# Patient Record
Sex: FEMALE | Race: WHITE | NOT HISPANIC OR LATINO | Employment: UNEMPLOYED | URBAN - METROPOLITAN AREA
[De-identification: names, ages, dates, MRNs, and addresses within clinical notes are randomized per-mention and may not be internally consistent; named-entity substitution may affect disease eponyms.]

---

## 2023-04-10 LAB
COLLECTION DURATION OF URINE: NORMAL
CORTISOL UR FREE PER VOLUME: NORMAL
CORTISOL, URINE FREE - PER 24H: NORMAL
CORTISOL, URINE INTERPRETATION: NORMAL
CORTISOL/CREATININE RATIO: NORMAL
CREATININE (MG/24HR) IN 24 HOUR URINE: NORMAL
CREATININE (MG/DL) IN URINE: NORMAL
CREATININE, URINE - PER VOLUME: NORMAL
HOURS COLLECTED (ARUP): NORMAL
TOTAL VOLUME (ARUP): NORMAL
VOLUME OF URINE: NORMAL

## 2023-04-11 LAB
CORTISOL (UG/DL) IN SERUM: 8.9 UG/DL (ref 2.5–20)
THYROTROPIN (MIU/L) IN SER/PLAS BY DETECTION LIMIT <= 0.05 MIU/L: 2.5 MIU/L (ref 0.44–3.98)
THYROXINE (T4) FREE (NG/DL) IN SER/PLAS: 1.18 NG/DL (ref 0.78–1.48)

## 2023-10-07 PROBLEM — G90.1 DYSAUTONOMIA (MULTI): Status: ACTIVE | Noted: 2023-10-07

## 2023-10-07 PROBLEM — K21.00 GASTROESOPHAGEAL REFLUX DISEASE WITH ESOPHAGITIS: Status: ACTIVE | Noted: 2023-10-07

## 2023-10-07 PROBLEM — F31.9 BIPOLAR AFFECTIVE (MULTI): Status: ACTIVE | Noted: 2023-10-07

## 2023-10-07 PROBLEM — M25.50 ARTHRALGIA: Status: ACTIVE | Noted: 2023-10-07

## 2023-10-07 PROBLEM — R06.00 DYSPNEA: Status: ACTIVE | Noted: 2023-10-07

## 2023-10-07 PROBLEM — M77.9 TENDONITIS: Status: ACTIVE | Noted: 2023-10-07

## 2023-10-07 PROBLEM — F41.0 PANIC DISORDER: Status: ACTIVE | Noted: 2023-10-07

## 2023-10-07 PROBLEM — E73.9 LACTOSE INTOLERANCE IN ADULT: Status: ACTIVE | Noted: 2023-10-07

## 2023-10-07 PROBLEM — R10.9 ABDOMINAL PAIN IN FEMALE: Status: ACTIVE | Noted: 2023-10-07

## 2023-10-07 PROBLEM — M70.60 GREATER TROCHANTERIC BURSITIS: Status: ACTIVE | Noted: 2023-10-07

## 2023-10-07 PROBLEM — N92.6 ABNORMAL MENSTRUAL PERIODS: Status: ACTIVE | Noted: 2023-10-07

## 2023-10-07 PROBLEM — J40 BRONCHITIS: Status: ACTIVE | Noted: 2023-10-07

## 2023-10-07 PROBLEM — F90.0 ADHD (ATTENTION DEFICIT HYPERACTIVITY DISORDER), INATTENTIVE TYPE: Status: ACTIVE | Noted: 2023-10-07

## 2023-10-07 PROBLEM — Q79.62: Status: ACTIVE | Noted: 2023-10-07

## 2023-10-07 PROBLEM — D89.40 MAST CELL ACTIVATION SYNDROME (MULTI): Status: ACTIVE | Noted: 2023-10-07

## 2023-10-07 PROBLEM — K75.81 NASH (NONALCOHOLIC STEATOHEPATITIS): Status: ACTIVE | Noted: 2023-10-07

## 2023-10-07 PROBLEM — K21.9 GERD WITHOUT ESOPHAGITIS: Status: ACTIVE | Noted: 2023-10-07

## 2023-10-07 PROBLEM — N94.6 MENSTRUAL CRAMPS: Status: ACTIVE | Noted: 2023-10-07

## 2023-10-07 PROBLEM — J45.909 ASTHMA (HHS-HCC): Status: ACTIVE | Noted: 2023-10-07

## 2023-10-07 PROBLEM — R51.9 HEADACHE: Status: ACTIVE | Noted: 2023-10-07

## 2023-10-07 PROBLEM — K90.41 WHEAT INTOLERANCE: Status: ACTIVE | Noted: 2023-10-07

## 2023-10-07 PROBLEM — F32.1 MODERATE MAJOR DEPRESSION (MULTI): Status: ACTIVE | Noted: 2023-10-07

## 2023-10-07 PROBLEM — I47.11 INAPPROPRIATE SINUS TACHYCARDIA (CMS-HCC): Status: ACTIVE | Noted: 2023-10-07

## 2023-10-07 PROBLEM — E55.9 VITAMIN D INSUFFICIENCY: Status: ACTIVE | Noted: 2023-10-07

## 2023-10-07 PROBLEM — F31.81 BIPOLAR 2 DISORDER (MULTI): Status: ACTIVE | Noted: 2023-10-07

## 2023-10-07 PROBLEM — M35.7 BENIGN JOINT HYPERMOBILITY: Status: ACTIVE | Noted: 2023-10-07

## 2023-10-07 PROBLEM — F41.9 ANXIETY: Status: ACTIVE | Noted: 2023-10-07

## 2023-10-07 PROBLEM — R53.82 CHRONIC FATIGUE: Status: ACTIVE | Noted: 2023-10-07

## 2023-10-07 PROBLEM — K44.9 HIATAL HERNIA: Status: ACTIVE | Noted: 2023-10-07

## 2023-10-07 PROBLEM — N12 PYELONEPHRITIS: Status: ACTIVE | Noted: 2023-10-07

## 2023-10-07 PROBLEM — G47.33 OBSTRUCTIVE SLEEP APNEA: Status: ACTIVE | Noted: 2023-10-07

## 2023-10-07 PROBLEM — G43.909 MIGRAINE: Status: ACTIVE | Noted: 2023-10-07

## 2023-10-07 PROBLEM — J11.2 INFLUENZA WITH GASTROINTESTINAL TRACT INVOLVEMENT: Status: ACTIVE | Noted: 2023-10-07

## 2023-10-07 RX ORDER — LORAZEPAM 4 MG/ML
INJECTION, SOLUTION INTRAMUSCULAR; INTRAVENOUS
COMMUNITY
Start: 2017-08-29

## 2023-10-07 RX ORDER — ONDANSETRON 4 MG/1
1 TABLET, FILM COATED ORAL 2 TIMES DAILY PRN
COMMUNITY
Start: 2017-03-04

## 2023-10-07 RX ORDER — OMEPRAZOLE 40 MG/1
1 CAPSULE, DELAYED RELEASE ORAL 2 TIMES DAILY
COMMUNITY
Start: 2017-03-29

## 2023-10-07 RX ORDER — HYDROXYZINE HYDROCHLORIDE 50 MG/1
1 TABLET, FILM COATED ORAL
COMMUNITY
Start: 2017-07-26

## 2023-10-07 RX ORDER — ONDANSETRON 4 MG/1
1 TABLET, ORALLY DISINTEGRATING ORAL EVERY 8 HOURS PRN
COMMUNITY
Start: 2018-01-05

## 2023-10-09 ENCOUNTER — OFFICE VISIT (OUTPATIENT)
Dept: PAIN MEDICINE | Facility: CLINIC | Age: 28
End: 2023-10-09
Payer: COMMERCIAL

## 2023-10-09 VITALS — HEIGHT: 63 IN | WEIGHT: 293 LBS | BODY MASS INDEX: 51.91 KG/M2 | RESPIRATION RATE: 17 BRPM

## 2023-10-09 DIAGNOSIS — M47.816 FACET ARTHRITIS OF LUMBAR REGION: Primary | ICD-10-CM

## 2023-10-09 DIAGNOSIS — M53.3 SACROILIAC JOINT DYSFUNCTION OF LEFT SIDE: ICD-10-CM

## 2023-10-09 PROCEDURE — 99203 OFFICE O/P NEW LOW 30 MIN: CPT | Performed by: PAIN MEDICINE

## 2023-10-09 ASSESSMENT — PAIN SCALES - GENERAL
PAINLEVEL_OUTOF10: 4
PAINLEVEL: 4

## 2023-10-09 ASSESSMENT — PAIN DESCRIPTION - DESCRIPTORS: DESCRIPTORS: ACHING;RADIATING

## 2023-10-09 ASSESSMENT — PAIN - FUNCTIONAL ASSESSMENT: PAIN_FUNCTIONAL_ASSESSMENT: 0-10

## 2023-10-09 NOTE — PROGRESS NOTES
History Of Present Illness  Monique Bender is a 27 y.o. female with a past medical history of anxiety, bipolar disorder presents, Tesha Danlos syndrome, Bertolotti sydrome (transitional L5 vertbrae), morbid obesity presents to pain clinic for management of low back pain.   Patient states she has had this pain for years and is worse with any sort of prolonged movement.  She states the pain is primarily on the left side of her lower back. She says bending forward makes her pain excruciating.   She does not endorse any true radicular symptoms at this time or motor weakness in her legs.  She is previously followed with pain management at [facility] that has primarily attributed her pain to weight.  She states she is seeing nutritionist as well as some physical therapy.  She is not interested in bariatric surgery at this time.  She has seen spine surgeon who recommended conservative management.  Last advanced imaging was done in 2022 that showed a transitional L5 vertebra along with mild degenerative changes at L4-5,  otherwise largely unremarkable.   Her medications patient takes gabapentin 300 neuropathic pain in her arms.  is also lorazepam she takes as needed as well as for depression.   She has previously been tried on topiramate however was discontinued due to kidney stones. The pain causes significant stress in the patient's life, specifically interferes with general activity, mood, walking ability, ability to perform tasks at home and/or work.  Patient participates in physical therapy and continues to perform physician directed exercises at home. Denies any bowel or bladder incontinence, saddle anesthesia, worsening pain, weakness or falls.     Past Medical History  She has a past medical history of Cough, unspecified (12/08/2014), Local infection of the skin and subcutaneous tissue, unspecified (02/11/2014), Personal history of other diseases of the digestive system (09/15/2017), Personal history  of other diseases of the respiratory system (12/06/2015), Personal history of other diseases of the respiratory system (09/21/2016), Personal history of other diseases of the respiratory system (12/06/2015), Personal history of other diseases of the respiratory system (12/08/2014), Personal history of other specified conditions (09/21/2015), Rash and other nonspecific skin eruption (11/11/2014), Sprain of unspecified ligament of unspecified ankle, initial encounter (02/19/2016), Unspecified abdominal pain (12/16/2014), Unspecified asthma, uncomplicated (12/20/2013), Unspecified injury of right foot, initial encounter (02/24/2016), Urinary tract infection, site not specified (09/21/2016), and Urinary tract infection, site not specified (06/03/2016).    Surgical History  She has a past surgical history that includes Tonsillectomy (09/11/2013).     Social History  She reports that she has quit smoking. Her smoking use included cigarettes. She does not have any smokeless tobacco history on file. She reports that she does not currently use alcohol. She reports that she does not use drugs.    Family History  Family History   Problem Relation Name Age of Onset    Cancer Paternal Grandfather          Allergies  Cefdinir, Levofloxacin, Morphine, Prednisone, Amoxicillin-pot clavulanate, Penicillins, Prochlorperazine, Sulfamethoxazole-trimethoprim, Ciprofloxacin, Sulfa (sulfonamide antibiotics), Aluminum chloride hexahydrate, and Esomeprazole    Review of Symptoms:   Constitutional: Negative for chills, diaphoresis or fever  HENT: Negative for neck swelling  Eyes:.  Negative for eye pain  Respiratory:.  Negative for cough, shortness of breath or wheezing    Cardiovascular:.  Negative for chest pain or palpitations  Gastrointestinal:.  Negative for abdominal pain, nausea and vomiting  Genitourinary:.  Negative for urgency  Musculoskeletal:  Positive for back pain. Positive for joint pain. Denies falls within the past 3  months.  Skin: Negative for wounds or itching   Neurological: Negative for dizziness, seizures, loss of consciousness and weakness  Endo/Heme/Allergies: Does not bruise/bleed easily  Psychiatric/Behavioral: Negative for depression. The patient does not appear anxious.       PHYSICAL EXAM  Vitals signs reviewed  Constitutional:       General: Not in acute distress     Appearance: Normal appearance. Not ill-appearing.  HENT:     Head: Normocephalic and atraumatic  Eyes:     Conjunctiva/sclera: Conjunctivae normal  Cardiovascular:     Rate and Rhythm: Normal rate and regular rhythm  Pulmonary:     Effort: No respiratory distress  Abdominal:     Palpations: Abdomen is soft  Musculoskeletal: WOODWARD  Skin:     General: Skin is warm and dry  Neurological:     General: No focal deficit present  Psychiatric:         Mood and Affect: Mood normal         Behavior: Behavior normal    Advanced Exam   Inspection: No gross deformities, no surgical scars  Palpation: No tenderness of patient of lumbar midline, lumbar paraspinals,  Tenderness over left SIJ  ROM: Normal range of motion of the lumbar flexion extension  Motor: 5-5 strength upper and lower extremities  Sensory: Negative for sensory abnormalities in upper and lower extremities  Reflexes: 2+ reflexes bilateral upper and lower extremities  Lumbar: Negative straight leg raising bilaterally, negative for facet loading  Sacral:  positive Sarbjit positive Gaenslen's on left side  Hip: Negative for pain with anterior, lateral, posterior palpation of hip joints, negative FADIR, negative for internal/external rotation of the hip, negative logroll     Last Recorded Vitals  Resp 17   Wt (!) 177 kg (390 lb)     Relevant Results  Current Outpatient Medications   Medication Instructions    hydrOXYzine HCL (Atarax) 50 mg tablet 1 tablet, oral, 3-4 TIMES DAILY.    LORazepam 4 mg/mL injection LORazepam 4 MG/ML Injection Solution   Refills: 0        Start : 29-Aug-2017   Active    omeprazole  (PriLOSEC) 40 mg DR capsule 1 capsule, oral, 2 times daily    ondansetron (Zofran) 4 mg tablet 1 tablet, oral, 2 times daily PRN    ondansetron ODT (Zofran-ODT) 4 mg disintegrating tablet 1 tablet, oral, Every 8 hours PRN       No results found for this or any previous visit from the past 1000 days.     No image results found.       1. Facet arthritis of lumbar region  Lumbar medial branch block         Joint Injection/Aspiration    Date/Time: 10/9/2023 2:43 PM    Performed by: Samir Pierre MD  Authorized by: Ramesh Ortiz MD    Consent:     Consent obtained:  Written    Consent given by:  Patient    Risks discussed:  Bleeding, infection, pain, incomplete drainage and nerve damage    Alternatives discussed:  No treatment  Universal protocol:     Procedure explained and questions answered to patient or proxy's satisfaction: yes      Relevant documents present and verified: yes      Test results available: yes      Imaging studies available: yes      Required blood products, implants, devices, and special equipment available: yes      Site/side marked: yes      Immediately prior to procedure, a time out was called: yes      Patient identity confirmed:  Verbally with patient  Location:     Location: Left Sacro-illac Joint.  Anesthesia:     Anesthesia method:  None  Procedure details:     Needle gauge: 25g.    Ultrasound guidance: no      Approach:  Posterior    Aspirate amount:  Negative    Steroid injected: no      Specimen collected: no    Post-procedure details:     Dressing:  Adhesive bandage    Procedure completion:  Tolerated well, no immediate complications     Procedure:  In office L SI Joint injection:  After verbal consent was obtained, the patient was placed in a prone position.    The area overlying the left sacroiliac joint was cleaned and prepped in sterile fashion.  Using landmark technique  the left SI joint was identified. A 25g 3.5 inch spinal needle was inserted and placed near the left SI    Joint. 4cc of .75% Bupiviaine was injected,  after negative aspiration. The patient tolerated the procedure well with no complications.       ASSESSMENT/PLAN  Monique Bender is a 27 y.o. female with a past medical history of anxiety, bipolar disorder presents, Tesha Danlos syndrome, Bertolotti sydrome (transitional L5 vertbrae), morbid obesity presents to pain clinic for management of low back pain.  Patient likely has a multifactorial component of her pain including both facet as well as SI joint component.  In addition, patient likely has a large component of the pain coming from her weight,  and is currently seeing specialists for this issue.  Patient has done physical therapy with minimal relief of her symptoms as well as acupuncture.  Patient had relief of pain with in office SI joint injection however she states this is different than her usual sources of pain in her back.   At this point it would be reasonable to schedule patient for left medial branch block and possible SI joint injection in the future    Our plan is as follows:  - Left lumbar medial branch nerve block  - Continue to participate in physical therapy as well as physician directed home exercises  - Continue participating in weight loss program  - Continue pain medications as prescribed  - Can consider left SI joint injection in the future       Samir Pierre MD

## 2023-11-03 ENCOUNTER — DOCUMENTATION (OUTPATIENT)
Dept: OPERATING ROOM | Facility: CLINIC | Age: 28
End: 2023-11-03
Payer: COMMERCIAL

## 2023-11-30 ENCOUNTER — APPOINTMENT (OUTPATIENT)
Dept: NEUROLOGY | Facility: HOSPITAL | Age: 28
End: 2023-11-30
Payer: COMMERCIAL

## 2023-12-13 ENCOUNTER — ANCILLARY PROCEDURE (OUTPATIENT)
Dept: RADIOLOGY | Facility: CLINIC | Age: 28
End: 2023-12-13
Payer: COMMERCIAL

## 2023-12-13 ENCOUNTER — HOSPITAL ENCOUNTER (OUTPATIENT)
Dept: OPERATING ROOM | Facility: CLINIC | Age: 28
Setting detail: OUTPATIENT SURGERY
Discharge: HOME | End: 2023-12-13
Payer: COMMERCIAL

## 2023-12-13 VITALS
WEIGHT: 293 LBS | HEIGHT: 63 IN | DIASTOLIC BLOOD PRESSURE: 62 MMHG | RESPIRATION RATE: 16 BRPM | HEART RATE: 88 BPM | SYSTOLIC BLOOD PRESSURE: 121 MMHG | OXYGEN SATURATION: 97 % | BODY MASS INDEX: 51.91 KG/M2 | TEMPERATURE: 98.2 F

## 2023-12-13 LAB — PREGNANCY TEST URINE, POC: NEGATIVE

## 2023-12-13 PROCEDURE — 62323 NJX INTERLAMINAR LMBR/SAC: CPT | Performed by: PAIN MEDICINE

## 2023-12-13 PROCEDURE — 2500000004 HC RX 250 GENERAL PHARMACY W/ HCPCS (ALT 636 FOR OP/ED): Mod: SE | Performed by: PAIN MEDICINE

## 2023-12-13 PROCEDURE — 81025 URINE PREGNANCY TEST: CPT | Performed by: ANESTHESIOLOGY

## 2023-12-13 PROCEDURE — 64493 INJ PARAVERT F JNT L/S 1 LEV: CPT | Performed by: PAIN MEDICINE

## 2023-12-13 PROCEDURE — 76000 FLUOROSCOPY <1 HR PHYS/QHP: CPT

## 2023-12-13 PROCEDURE — 7100000009 HC PHASE TWO TIME - INITIAL BASE CHARGE: Performed by: PAIN MEDICINE

## 2023-12-13 PROCEDURE — 64495 INJ PARAVERT F JNT L/S 3 LEV: CPT | Performed by: PAIN MEDICINE

## 2023-12-13 PROCEDURE — 2500000005 HC RX 250 GENERAL PHARMACY W/O HCPCS: Mod: SE | Performed by: PAIN MEDICINE

## 2023-12-13 PROCEDURE — 64494 INJ PARAVERT F JNT L/S 2 LEV: CPT | Performed by: PAIN MEDICINE

## 2023-12-13 PROCEDURE — 99152 MOD SED SAME PHYS/QHP 5/>YRS: CPT | Performed by: PAIN MEDICINE

## 2023-12-13 PROCEDURE — 7100000010 HC PHASE TWO TIME - EACH INCREMENTAL 1 MINUTE: Performed by: PAIN MEDICINE

## 2023-12-13 RX ORDER — ERGOCALCIFEROL 1.25 MG/1
1 CAPSULE ORAL
COMMUNITY

## 2023-12-13 RX ORDER — DEXLANSOPRAZOLE 60 MG/1
60 CAPSULE, DELAYED RELEASE ORAL DAILY
COMMUNITY
Start: 2016-01-01

## 2023-12-13 RX ORDER — MIDAZOLAM HYDROCHLORIDE 1 MG/ML
2 INJECTION, SOLUTION INTRAMUSCULAR; INTRAVENOUS ONCE
Status: DISCONTINUED | OUTPATIENT
Start: 2023-12-13 | End: 2023-12-14 | Stop reason: HOSPADM

## 2023-12-13 RX ORDER — LIDOCAINE HYDROCHLORIDE 5 MG/ML
INJECTION, SOLUTION INFILTRATION; PERINEURAL AS NEEDED
Status: COMPLETED | OUTPATIENT
Start: 2023-12-13 | End: 2023-12-13

## 2023-12-13 RX ORDER — ROPIVACAINE HYDROCHLORIDE 5 MG/ML
INJECTION, SOLUTION EPIDURAL; INFILTRATION; PERINEURAL AS NEEDED
Status: COMPLETED | OUTPATIENT
Start: 2023-12-13 | End: 2023-12-13

## 2023-12-13 RX ORDER — NORETHINDRONE 5 MG/1
5 TABLET ORAL
COMMUNITY
Start: 2023-07-19 | End: 2024-07-18

## 2023-12-13 RX ORDER — PROPRANOLOL HYDROCHLORIDE 20 MG/1
20 TABLET ORAL 3 TIMES DAILY
COMMUNITY
Start: 2021-01-01

## 2023-12-13 RX ORDER — ACETAMINOPHEN 160 MG/5ML
SUSPENSION ORAL
COMMUNITY

## 2023-12-13 RX ORDER — MIDAZOLAM HYDROCHLORIDE 1 MG/ML
INJECTION, SOLUTION INTRAMUSCULAR; INTRAVENOUS AS NEEDED
Status: COMPLETED | OUTPATIENT
Start: 2023-12-13 | End: 2023-12-13

## 2023-12-13 RX ORDER — FAMOTIDINE 20 MG/1
20 TABLET, FILM COATED ORAL 2 TIMES DAILY
COMMUNITY
Start: 2023-10-01

## 2023-12-13 RX ORDER — LAMOTRIGINE 25 MG/1
25 TABLET ORAL DAILY
COMMUNITY

## 2023-12-13 RX ORDER — ROPIVACAINE HYDROCHLORIDE 5 MG/ML
10 INJECTION, SOLUTION EPIDURAL; INFILTRATION; PERINEURAL ONCE
Status: DISCONTINUED | OUTPATIENT
Start: 2023-12-13 | End: 2023-12-14 | Stop reason: HOSPADM

## 2023-12-13 RX ADMIN — ROPIVACAINE HYDROCHLORIDE 10 ML: 5 INJECTION, SOLUTION EPIDURAL; INFILTRATION; PERINEURAL at 11:23

## 2023-12-13 RX ADMIN — LIDOCAINE HYDROCHLORIDE 5 ML: 5 INJECTION, SOLUTION INFILTRATION; PERINEURAL at 11:22

## 2023-12-13 RX ADMIN — MIDAZOLAM 2 MG: 1 INJECTION INTRAMUSCULAR; INTRAVENOUS at 11:23

## 2023-12-13 ASSESSMENT — PAIN SCALES - GENERAL
PAINLEVEL_OUTOF10: 0 - NO PAIN
PAINLEVEL_OUTOF10: 3
PAINLEVEL_OUTOF10: 0 - NO PAIN

## 2023-12-13 ASSESSMENT — PAIN - FUNCTIONAL ASSESSMENT
PAIN_FUNCTIONAL_ASSESSMENT: 0-10

## 2023-12-13 NOTE — PROCEDURES
Monique Bender is a 28 y.o. female  with lumbar spondylosis and left sided Bertolotti syndrome here for left medial branch block #    Procedure performed: left lumbar medial branch blocks at L3, 4, 5 using fluoroscopic guidance    Indication: Lumbar spondylosis and Bertolotti syndrome    Performed by: Dr. Ortiz  Assistant: Yung Dennison MD    The patient was identified in the preoperative holding area and informed consent was obtained.  The surgical area was marked according to protocol.  The patient was transported to the operating room on the [facility] and a timeout was conducted. ASA Standard monitors were applied.     The patient was transferred to the operating room table and placed in prone position.  X-ray guidance was used to ascertain the appropriate levels.  The site was prepped and draped in the usual fashion using ChloraPrep and sterile towels.  Skin anesthesia was achieved with 0.5% lidocaine with bicarbonate using a 27-gauge hypodermic needle.  2.5 inch quincke needles were advanced to the target areas using biplanar fluoroscopy.  1 mL of 0.5% ropivacaine were injected at each level. The needles were removed.  Hemostasis was ensured.  A Band-Aid was applied.      The procedure was done without any evidence of complications and the patient was transported to the recovery area. The patient was discharged in stable condition.    Anesthesia: Local and  midazolam conscious sedation    All medications used were preservative free and confirmed to be not-.

## 2023-12-13 NOTE — H&P
HISTORY AND PHYSICAL    History Of Present Illness  Monique Bender is a 28 y.o. female presenting with chronic pain.  Here for left medial branch blocks.     she denies any recent antibiotic use or infections, she denies any blood thinner use , and she denies contrast or local anesthetic allergies     Past Medical History  Past Medical History:   Diagnosis Date    Cough, unspecified 12/08/2014    Cough    Irregular heart beat     Local infection of the skin and subcutaneous tissue, unspecified 02/11/2014    Pustule    Personal history of other diseases of the digestive system 09/15/2017    History of gastritis    Personal history of other diseases of the respiratory system 12/06/2015    History of sinusitis    Personal history of other diseases of the respiratory system 09/21/2016    History of bronchitis    Personal history of other diseases of the respiratory system 12/06/2015    History of acute pharyngitis    Personal history of other diseases of the respiratory system 12/08/2014    History of upper respiratory infection    Personal history of other specified conditions 09/21/2015    History of urinary frequency    Rash and other nonspecific skin eruption 11/11/2014    Rash    Sinusitis     Sprain of unspecified ligament of unspecified ankle, initial encounter 02/19/2016    Ankle sprain    SVT (supraventricular tachycardia)     Unspecified abdominal pain 12/16/2014    Side pain    Unspecified asthma, uncomplicated 12/20/2013    Childhood asthma    Unspecified injury of right foot, initial encounter 02/24/2016    Right foot injury    Urinary tract infection, site not specified 09/21/2016    Acute UTI    Urinary tract infection, site not specified 06/03/2016    UTI (lower urinary tract infection)       Surgical History  Past Surgical History:   Procedure Laterality Date    ADENOIDECTOMY      TONSILLECTOMY  09/11/2013    Tonsillectomy With Adenoidectomy        Social History  She reports that she has quit  "smoking. Her smoking use included cigarettes. She has never used smokeless tobacco. She reports that she does not currently use alcohol. She reports that she does not use drugs.    Family History  Family History   Problem Relation Name Age of Onset    Cancer Paternal Grandfather          Allergies  Cefdinir, Levofloxacin, Morphine, Prednisone, Amoxicillin-pot clavulanate, Penicillins, Prochlorperazine, Sulfamethoxazole-trimethoprim, Ciprofloxacin, Sulfa (sulfonamide antibiotics), Aluminum chloride hexahydrate, and Esomeprazole    Review of Systems   12 point ROS done and negative except for the above.   Physical Exam     General: NAD, well groomed, well nourished  Eyes: Non-icteric sclera, EOMI  Ears, Nose, Mouth, and Throat: External ears and nose appear to be without deformity or rash. No lesions or masses noted. Hearing is grossly intact.   Neck: Trachea midline  Respiratory: Nonlabored breathing   Cardiovascular: No peripheral edema   Skin: No rashes or open lesions/ulcers identified on skin.    Last Recorded Vitals  Pulse 96, temperature 36.7 °C (98.1 °F), temperature source Oral, resp. rate 16, height 1.6 m (5' 3\"), weight (!) 173 kg (380 lb 8.2 oz), SpO2 96 %.    Relevant Results           Assessment/Plan       Risks, benefits, alternatives discussed. All questions answered to the best of my ability. Patient agrees to proceed.   -We will proceed with planned procedure        Yung Dennison MD  Chronic Pain Fellow  [facility]   "

## 2023-12-13 NOTE — DISCHARGE INSTRUCTIONS
"Post-diagnostic block instructions:    You had a diagnostic block procedure today. The point was not necessarily to treat your pain, but to ensure we have diagnosed it correctly.   The pain relief you feel will only last as long as the numbing medication we injected--this will not likely give you long-term relief, only short term.   While it is working (while you are feeling pain relief), try to do the things that normally aggravate your pain--whether that is turning your head, shopping, walking around, gardening, etc.     Pay attention to how much pain relief (what percentage compared to before the procedure) you get and for how long it lasts. We will ask you for this at the follow up visit.     For insurance approval reasons, we usually need two successful diagnostic blocks prior to doing any nerve ablation, aka \"burning the nerve\".   For a block to be considered successful we need more than fifty percent pain relief after the procedure.     After we confirm the blocks are successful, the ablation will give you long term relief--usually around six months it is hard to predict how long you will feel benefit from any ablation.   If the ablation works well for you, you will not have to repeat diagnostic blocks before you have another ablation, if you need another one in the same area.     Activity: Avoid strenuous activity for 24 hours. But try to do mildly active things that would normally aggravate your pain. One day after the procedure, return to your normal activity level.     Bandages: Remove tomorrow    Showering/Bathing: You may shower after bandage is removed     Follow up: With Dr. Ortiz over the phone in one week to discuss how you are doing  Call Summer to Schedule.   Summer's Phone:  [phone]    After hours, call the   ([phone]) and ask for the pain management answering service if you notice any of the below:     Call the doctor immediately: if you notice:    Excessive bleeding from " procedure site (brisk bright red bleeding from the site or bleeding that soaks the bandages or does not stop)   Severe headache  Inability to walk, leg or arm weakness or numbness that is significantly worse after the procedure   Uncontrolled pain   Inability to control your bowels or bladder   Signs of infection: Fever above 101.5F, redness, swelling, pus or drainage from the site

## 2023-12-14 ASSESSMENT — PAIN SCALES - GENERAL: PAINLEVEL_OUTOF10: 2

## 2023-12-29 ENCOUNTER — NURSE ONLY (OUTPATIENT)
Dept: PAIN MEDICINE | Facility: CLINIC | Age: 28
End: 2023-12-29
Payer: COMMERCIAL

## 2024-02-15 ENCOUNTER — APPOINTMENT (OUTPATIENT)
Dept: NEUROLOGY | Facility: HOSPITAL | Age: 29
End: 2024-02-15
Payer: COMMERCIAL

## 2024-03-07 ENCOUNTER — APPOINTMENT (OUTPATIENT)
Dept: NEUROLOGY | Facility: HOSPITAL | Age: 29
End: 2024-03-07
Payer: COMMERCIAL

## 2024-03-07 ENCOUNTER — OFFICE VISIT (OUTPATIENT)
Dept: NEUROLOGY | Facility: HOSPITAL | Age: 29
End: 2024-03-07
Payer: COMMERCIAL

## 2024-03-07 VITALS
HEART RATE: 86 BPM | SYSTOLIC BLOOD PRESSURE: 120 MMHG | HEIGHT: 63 IN | DIASTOLIC BLOOD PRESSURE: 71 MMHG | WEIGHT: 293 LBS | RESPIRATION RATE: 18 BRPM | BODY MASS INDEX: 51.91 KG/M2

## 2024-03-07 DIAGNOSIS — E66.01 MORBID OBESITY (MULTI): ICD-10-CM

## 2024-03-07 DIAGNOSIS — G90.1 DYSAUTONOMIA (MULTI): Primary | ICD-10-CM

## 2024-03-07 PROCEDURE — 99417 PROLNG OP E/M EACH 15 MIN: CPT | Performed by: PSYCHIATRY & NEUROLOGY

## 2024-03-07 PROCEDURE — 1036F TOBACCO NON-USER: CPT | Performed by: PSYCHIATRY & NEUROLOGY

## 2024-03-07 PROCEDURE — 99215 OFFICE O/P EST HI 40 MIN: CPT | Performed by: PSYCHIATRY & NEUROLOGY

## 2024-03-07 PROCEDURE — 99205 OFFICE O/P NEW HI 60 MIN: CPT | Performed by: PSYCHIATRY & NEUROLOGY

## 2024-03-07 RX ORDER — LORAZEPAM 0.5 MG/1
0.5 TABLET ORAL DAILY PRN
COMMUNITY

## 2024-03-07 RX ORDER — FREMANEZUMAB-VFRM 225 MG/1.5ML
225 INJECTION SUBCUTANEOUS
COMMUNITY

## 2024-03-07 RX ORDER — SERTRALINE HYDROCHLORIDE 100 MG/1
100 TABLET, FILM COATED ORAL NIGHTLY
COMMUNITY

## 2024-03-07 RX ORDER — SERTRALINE HYDROCHLORIDE 25 MG/1
25 TABLET, FILM COATED ORAL NIGHTLY
COMMUNITY

## 2024-03-07 RX ORDER — LAMOTRIGINE 100 MG/1
100 TABLET ORAL NIGHTLY
COMMUNITY

## 2024-03-07 RX ORDER — GABAPENTIN 600 MG/1
600 TABLET ORAL NIGHTLY
COMMUNITY

## 2024-03-07 ASSESSMENT — PAIN SCALES - GENERAL: PAINLEVEL: 4

## 2024-03-07 NOTE — PROGRESS NOTES
[facility] AUTONOMIC PROGRAM         Geogre Naylor MD    Professor of Neurology  [facility]  Senior Attending Physician- [facility]  Director, Autonomic Program and Laboratories  Medical Director, [place] [address]  Office: [phone]  Assistant: Patito Bahena (email: [email or web address])       AUTONOMIC NERVOUS SYSTEM CONSULTATION    Patient Information     Medical Record Number: [id number]   YOB: 1995    Home Address: [address] [place] [address]   Phone Number:  [phone]      Primary Care Physician: No primary care provider on file.    Referring Physician: No referring provider defined for this encounter.    Patient accompanied by: Friend, Noa  In addition to attending physician, patient seen by: Abel Blackwell MD, Neuromuscular Fellow    Clinical Scores    Compass 31 : 56      IMPRESSION:  Ms. 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  She has a nearly 2 decades long history of orthostatic intolerance with symptoms of lightheadedness, palpitations, brain fog, and hyperhidrosis.  She reports some response to propranolol. Her weight and body habitus has limited the ability for her to undergo autonomic testing, as it can produce false positives and unreliable data.     We discussed that it is necessary to get a baseline evaluation of her autonomic testing. In order to obtain next accurate results from an autonomic workup and testing, it is medically necessary that she continues aggressive measures for weight loss aiming for a BMI of 35, whether this is accomplished through consultation with nutrition, endocrinology, or bariatric surgery.      In addition, the treatment for her orthostatic intolerance, whether this is POTS or inappropriate sinus tachycardia, is similar and that he requires a lot of mobility, particularly for POTS physical therapy, and conservative measures such as daily jogging in restarting water.  Which is one of the cornerstone treatments among other measures towards improvement in orthostatic symptoms.    PLAN/RECOMMENDATIONS:   -Autonomic panel to evaluate for underlying autoimmunity  -Referral placed to  bariatric surgery to discuss options     Abel Blackwell MD  Neuromuscular Fellow      HPI  Ms. Monique Bender, 28-year-old woman with history of bipolar disorder, anxiety, PTSD, Tesha-Danlos syndrome, migraines, obesity (BMI 66) presents to  autonomic program for evaluation of dysautonomia.  She is self-referred.     Her symptoms started as early as 7-8 years old from what she can recall.  When she had to accompany her mother to the store, will frequently feel lightheaded, flushed, and had to sit down with any prolonged standing.  Her symptoms continued to worsen over the next 19 years, and are significantly worse now as an adult compared to in her youth.    When she stands, she experiences lightheadedness, palpitations, brain fog, and hyperhidrosis particularly from her head.  She has never had syncope as a result of her symptoms before.  In addition, over the past few years, she has developed new symptoms of regulation of her temperature, at times will feel extremely hot or extremely cold.  Sometimes she will feel extremely cold after showers, and will even need to use heating pads to warm herself up.    As for workup, she has been diagnosed with a mast cell activation disorder, and her orthostatic intolerance is thought to be either diagnosis of postural orthostatic tachycardia syndrome versus inappropriate sinus tachycardia with orthostatic intolerance. She has not had tilt table testing previously due to her weight;  since she had previously followed with Dr. Deras at [facility].    There has been some workup for autoimmunity with a reportedly positive NATALIE in the past.    She is currently taking propranolol 20 mg every day at bedtime, with an additional 20 mg as needed for when she is leaving the house.  This seems to help a little with her symptoms.    As for conservative measures:  -She estimates about 64 to 96 ounces of water per day, does not drink any other types of fluids including caffeine alcohol or juice  -Has difficulty increasing sodium in her diet, never had success with measuring out salt from the container.  Does feel her symptoms improve after eating salty foods such as Chinese food and Ramen  - While she has an adjustable bed; she sleeps on her stomach, and is completely unable to sleep on her back  - She eats 1 meal per day, only at dinner, notices that with normal-sized meals or slightly larger meals, her symptoms tend to be worse.  She is unable to split her meals into smaller meals throughout the day, as she deals with frequent nausea limiting her oral intake.  -Physical activity is limited due to her symptoms    Reports dry eyes worse than dry mouth.  She reports diarrhea more than constipation.  She has migrating joint pain here and there which she attributes to her Tesha-Danlos syndrome.  Denies numbness or tingling in her extremities.    Weight management and weight loss continues to be a difficult aspect for her, as she reports only eating 1 meal per day and has not had great success in her weight loss.  She is working with a nutritionist and endocrinologist.  She has even considered and met with bariatric surgery in the past to discuss her options, though has several major concerns about her healing and true efficacy of bariatric surgery in her case.    Family history: There is no autoimmune history in the family, no other family members have orthostatic intolerance.  She reports several members  in her family struggle with obesity.   Social: Currently not working, quit smoking in 2018, denies recreational drug use   Allergies to several antibiotics, and adverse reactions to morphine and prednisone    AUTONOMIC REVIEW OF SYSTEMS    COMPASS 31 for research purposes only (see below)  1 1. In the past year, have you ever felt faint, dizzy, “goofy”, or had difficulty thinking soon after standing up from a sitting or lying position? Yes   3 2. When standing up, how frequently do you get these feelings or symptoms? Almost Always   2 3. How would you rate the severity of these feelings or symptoms? Moderate   1 4. In the past year, have these feelings or symptoms that you have experienced: Stayed about the same   7     28     1 5. In the past year, have you ever noticed color changes in your skin, such as red, white, or purple? (If you answer no, please skip to question 8) Yes   2 6. What parts of your body are affected by these color changes? Hands and Feet   1 7. Have these changes in your skin color: Stayed about the same   4     3     1 8. In the past 5 years, what changes, if any, have occurred in your general body sweating? I sweat much more than I used to   1 9. Do your eyes feel excessively dry? Yes   1 10. Does you mouth feel excessively dry? Yes   2 11. For the symptom of dry eyes or dry mouth that you have had for the longest period of time, has this symptom: Gotten somewhat worse   5     11     0 12. In the past year, have you noticed any changes in how quickly you get full when eating a meal? I haven’t noticed any change   1 13. In the past year, have you felt excessively full or persistently full (bloated feeling) after a meal? Sometimes   0 14. In the past year, have you vomited after a meal? Never   1 15. In the past year, have you had a cramping or colicky abdominal pain? Sometimes   1 16. In the past year, have you had any bouts of diarrhea? (If you answer no, please skip to question 20) Yes   2  17. How frequently does this occur? Frequently   2 18. How severe are these bouts of diarrhea? Moderate   1 19. Have your bouts of diarrhea gotten: Stayed about the same   1 20. In the past year, have you been constipated? (If you answer no, please skip to question 24) Yes   1 21. How frequently are you constipated? Occasionally   1 22. How severe are these episodes of constipation? Mild   1 23. Has your constipation gotten: Stayed about the same   12     11     0 24. In the past year, have you ever lost control of your bladder function? Never   0 25. In the past year, have you had difficulty passing urine? Never   0 26. In the past year, have you had trouble completely emptying your bladder? Never   0     0     3 27. In the past year, without sunglasses or tinted glasses, has bright light bothered your eyes? (If you darinel never, please skip to question 29) Constantly   2 28. How severe is this sensitivity to bright light? Moderate   1 29. In the past year, have you had trouble focusing your eyes? (If you darinel never, please skip to question 31) Occasionally   1 30. How severe is this focusing problem? Mild   2 31. Has the most troublesome symptom with your eyes (i.e. sensitivity to bright light or trouble focusing) gotten: Gotten somewhat worse   9     [id number]          TOTAL     56 /100        GENERAL EXAMINATION  GENERAL:  No distress, alert, interactive and cooperative.     NEURO EXAM:  MENTAL STATE:   Alert and oriented to self, date, place. Recent and remote memory was intact.  Attention span and concentration were normal. Language testing was normal for comprehension and expression.  General fund of knowledge was intact.     CRANIAL NERVES:   CN 2   Visual fields full to confrontation.   CN 3, 4, 6    Pupils round, 3 mm in diameter, equally reactive to light. Lids symmetric; no ptosis. EOMs normal alignment, full range with normal saccades, pursuit and convergence. No nystagmus.   CN 5   Facial sensation  intact bilaterally.   CN 7   Normal and symmetric facial strength. Nasolabial folds symmetric.   CN 8   Hearing intact to finger rub   CN 9   Palate elevates symmetrically.   CN 11   Normal strength of shoulder shrug and neck turning.   CN 12   Tongue midline, with normal bulk and strength; no fasciculations.     MOTOR:   Muscle bulk: No apparent atrophy of muscle bulk observed or asymmetry  Muscle tone: Normal in both upper and lower extremities.  Movements: No tremors or other abnormal movement.    Deltoid: R5L5  Biceps: R5L5    Triceps: R5L5  Wrist Flex: R5L5  Wrist Ext: R5L5  Finger Abd: R5L5  Finger Flex: R5L5  Finger Extension: R5L5  Thumb ABD: R5L5  Thumb Flex: R5L5    Hip Flex: R5L5  Knee Flex: R5L5  Knee Ext: R5L5  DorsiFlex: R5L5  PlantarFlex: R5L5    SENSORY:  LT intact in all extremities     REFLEXES:   Biceps: R2L2  Triceps: R2L2  Brachioradialis: R2L2  Patellar: R2L2  Achilles: R2L2    COORDINATION: Finger-Nose-Finger: intact b/l,    GAIT -decreased speed and armswing.  Decreased stride length.  Mild Trendelenburg gait bilaterally.  She could toe walk, heel walk, and tandem without difficulty.  Romberg negative.      Attending's Note:   I saw the patient with Dr. Blackwell. I agree with Dr. Blackwell's impression and recommendations/plan. We discussed management of this patient together and I would not have more to add to the above, except that this is a difficult situation due to the patient's body weight which will affect the feasibility and results of autonomic testing, as well as complicate any treatment for POTS, whether it is sleeping up at 45 degree, water jogging, POTS training or if immune modulation is necessary, treatment with steroids of IVIG would be a problem as both carry more than average potential complications in her case.    Therefore, it will be extremely important to address the weight issue first.    I spent 90 minutes with the patient, at least 50% of which were dedicated to education and  detailing diagnostic plans and management.      George Naylor MD

## 2024-03-07 NOTE — PROGRESS NOTES
Ms. Monique Bender, 28-year-old woman with history of bipolar disorder, anxiety, PTSD, Tesha-Danlos syndrome, migraines, postural orthostatic tachycardia syndrome (POTS), presents to  autonomic program for evaluation of dysautonomia.  She is self-referred. Mast cell disorder.     Inapprorpriate sinus tachy    Since 7-8 years old, feeling faint, back to school shopping in the dressing rooms, Significantly worse as an adult.     Feeling very faint, lightheadaed, and horrible sweating, and contributing dehydration. Dripping sweat from the head.     A little reclined while asleep. Never passed out. Goes fast. Brain fog comes on when standing.     Propranolol,     More hydrated it helps.   Dont eat enough to get sodium.     Nasueous all the time.     Hard to get salt in. Chinese food, ramen. Really seem to help.     Cannot sleep on back, sleep on stomach.     8-12 8oz;   64-96oz.         Her daily symptoms include:     previous testing:     [id number]  Timing and progression of orthostatic symptoms  Non orthostatic symptoms  Precipitating and aggravating factors  Amounts of fluid and caffeine intake no alcohl or acffeine   Response to meals? Large meals or even meals, symptoms are worse  Abnormal sweating?   Diarrhea more than constipation:   EDS- migrating joint pain.   Dry eyes, dry mouth? Eyes worse than mouth.   Numbness or tingling?   Level of physical activity and standing time   Response to previously attempted treatments   Current drug therapy.   Previous testing: EKG, Echo, Tilt? Couldn't get tilt because of body habitus.     Counseling: limited autonomic neuropathy, hyperadrenergic state, hypovolemia, venous pooling, joint hypermobility, deconditioning    Newer symptom is body temperature -> feel freezing or hot, freezing after a shower will actually need heating pads to warm herself up.     Trouble swallowing.     At one point extended release propranolol, 20mg every night, and usually 20mg if she needs to  go somewhere.     Family history: no autoimmune, no pots  Social: at home, no smoking quit 2018, no recreational stuff  Allergies:       Saw Dr. Deras,     Whether IST or POTS, very similar. Important to know how her autonomic function is doing. Need better understanding of her autonomic function.    Treatment involves a lot of mobility. Pots training,  6 month program.   Water jogging.

## 2024-03-07 NOTE — PATIENT INSTRUCTIONS
We will send off our autonomic panel of laboratory testing to evaluate for autoimmune disease.  Please fast for 12 hours prior to getting these lab studies.     We have placed a referral for our  Bariatric program.

## 2024-03-14 ENCOUNTER — LAB (OUTPATIENT)
Dept: LAB | Facility: LAB | Age: 29
End: 2024-03-14
Payer: COMMERCIAL

## 2024-03-14 DIAGNOSIS — G90.1 DYSAUTONOMIA (MULTI): ICD-10-CM

## 2024-03-14 LAB
ANION GAP SERPL CALC-SCNC: 12 MMOL/L (ref 10–20)
BASOPHILS # BLD AUTO: 0.07 X10*3/UL (ref 0–0.1)
BASOPHILS NFR BLD AUTO: 0.8 %
BUN SERPL-MCNC: 8 MG/DL (ref 6–23)
CALCIUM SERPL-MCNC: 9.3 MG/DL (ref 8.6–10.6)
CENTROMERE B AB SER-ACNC: <0.2 AI
CERULOPLASMIN SERPL-MCNC: 48.6 MG/DL (ref 20–60)
CHLORIDE SERPL-SCNC: 103 MMOL/L (ref 98–107)
CHROMATIN AB SERPL-ACNC: 0.5 AI
CO2 SERPL-SCNC: 27 MMOL/L (ref 21–32)
CREAT SERPL-MCNC: 0.8 MG/DL (ref 0.5–1.05)
CRP SERPL-MCNC: 2.08 MG/DL
DSDNA AB SER-ACNC: 1 IU/ML
EGFRCR SERPLBLD CKD-EPI 2021: >90 ML/MIN/1.73M*2
ENA JO1 AB SER QL IA: <0.2 AI
ENA RNP AB SER IA-ACNC: 0.2 AI
ENA SCL70 AB SER QL IA: <0.2 AI
ENA SM AB SER IA-ACNC: <0.2 AI
ENA SM+RNP AB SER QL IA: <0.2 AI
ENA SS-A AB SER IA-ACNC: <0.2 AI
ENA SS-B AB SER IA-ACNC: <0.2 AI
EOSINOPHIL # BLD AUTO: 0.18 X10*3/UL (ref 0–0.7)
EOSINOPHIL NFR BLD AUTO: 2.1 %
ERYTHROCYTE [DISTWIDTH] IN BLOOD BY AUTOMATED COUNT: 14.4 % (ref 11.5–14.5)
ERYTHROCYTE [SEDIMENTATION RATE] IN BLOOD BY WESTERGREN METHOD: 45 MM/H (ref 0–20)
GLUCOSE SERPL-MCNC: 91 MG/DL (ref 74–99)
HCT VFR BLD AUTO: 45 % (ref 36–46)
HGB BLD-MCNC: 14.3 G/DL (ref 12–16)
IMM GRANULOCYTES # BLD AUTO: 0.04 X10*3/UL (ref 0–0.7)
IMM GRANULOCYTES NFR BLD AUTO: 0.5 % (ref 0–0.9)
LYMPHOCYTES # BLD AUTO: 2.23 X10*3/UL (ref 1.2–4.8)
LYMPHOCYTES NFR BLD AUTO: 25.5 %
MCH RBC QN AUTO: 26.9 PG (ref 26–34)
MCHC RBC AUTO-ENTMCNC: 31.8 G/DL (ref 32–36)
MCV RBC AUTO: 85 FL (ref 80–100)
MONOCYTES # BLD AUTO: 0.54 X10*3/UL (ref 0.1–1)
MONOCYTES NFR BLD AUTO: 6.2 %
NEUTROPHILS # BLD AUTO: 5.69 X10*3/UL (ref 1.2–7.7)
NEUTROPHILS NFR BLD AUTO: 64.9 %
NRBC BLD-RTO: 0 /100 WBCS (ref 0–0)
PLATELET # BLD AUTO: 391 X10*3/UL (ref 150–450)
POTASSIUM SERPL-SCNC: 4 MMOL/L (ref 3.5–5.3)
PROT SERPL-MCNC: 6.8 G/DL (ref 6.4–8.2)
PROT UR-ACNC: 28 MG/DL (ref 5–25)
RBC # BLD AUTO: 5.32 X10*6/UL (ref 4–5.2)
RHEUMATOID FACT SER NEPH-ACNC: <10 IU/ML (ref 0–15)
RIBOSOMAL P AB SER-ACNC: <0.2 AI
SODIUM SERPL-SCNC: 138 MMOL/L (ref 136–145)
T3FREE SERPL-MCNC: 3.8 PG/ML (ref 2.3–4.2)
T4 SERPL-MCNC: 8.7 UG/DL (ref 4.5–11.1)
THYROPEROXIDASE AB SERPL-ACNC: <28 IU/ML
VIT B12 SERPL-MCNC: 332 PG/ML (ref 211–911)
WBC # BLD AUTO: 8.8 X10*3/UL (ref 4.4–11.3)

## 2024-03-14 PROCEDURE — 86800 THYROGLOBULIN ANTIBODY: CPT

## 2024-03-14 PROCEDURE — 84446 ASSAY OF VITAMIN E: CPT

## 2024-03-14 PROCEDURE — 83090 ASSAY OF HOMOCYSTEINE: CPT

## 2024-03-14 PROCEDURE — 86335 IMMUNFIX E-PHORSIS/URINE/CSF: CPT

## 2024-03-14 PROCEDURE — 84166 PROTEIN E-PHORESIS/URINE/CSF: CPT | Performed by: PSYCHIATRY & NEUROLOGY

## 2024-03-14 PROCEDURE — 36415 COLL VENOUS BLD VENIPUNCTURE: CPT

## 2024-03-14 PROCEDURE — 82607 VITAMIN B-12: CPT

## 2024-03-14 PROCEDURE — 86235 NUCLEAR ANTIGEN ANTIBODY: CPT

## 2024-03-14 PROCEDURE — 84481 FREE ASSAY (FT-3): CPT

## 2024-03-14 PROCEDURE — 84165 PROTEIN E-PHORESIS SERUM: CPT | Performed by: PSYCHIATRY & NEUROLOGY

## 2024-03-14 PROCEDURE — 85025 COMPLETE CBC W/AUTO DIFF WBC: CPT

## 2024-03-14 PROCEDURE — 84156 ASSAY OF PROTEIN URINE: CPT

## 2024-03-14 PROCEDURE — 84155 ASSAY OF PROTEIN SERUM: CPT

## 2024-03-14 PROCEDURE — 84436 ASSAY OF TOTAL THYROXINE: CPT

## 2024-03-14 PROCEDURE — 82384 ASSAY THREE CATECHOLAMINES: CPT

## 2024-03-14 PROCEDURE — 86225 DNA ANTIBODY NATIVE: CPT

## 2024-03-14 PROCEDURE — 82542 COL CHROMOTOGRAPHY QUAL/QUAN: CPT

## 2024-03-14 PROCEDURE — 85652 RBC SED RATE AUTOMATED: CPT

## 2024-03-14 PROCEDURE — 86325 OTHER IMMUNOELECTROPHORESIS: CPT | Performed by: PSYCHIATRY & NEUROLOGY

## 2024-03-14 PROCEDURE — 84207 ASSAY OF VITAMIN B-6: CPT

## 2024-03-14 PROCEDURE — 86255 FLUORESCENT ANTIBODY SCREEN: CPT

## 2024-03-14 PROCEDURE — 86038 ANTINUCLEAR ANTIBODIES: CPT

## 2024-03-14 PROCEDURE — 86738 MYCOPLASMA ANTIBODY: CPT

## 2024-03-14 PROCEDURE — 86376 MICROSOMAL ANTIBODY EACH: CPT

## 2024-03-14 PROCEDURE — 82525 ASSAY OF COPPER: CPT

## 2024-03-14 PROCEDURE — 84165 PROTEIN E-PHORESIS SERUM: CPT

## 2024-03-14 PROCEDURE — 84166 PROTEIN E-PHORESIS/URINE/CSF: CPT

## 2024-03-14 PROCEDURE — 82085 ASSAY OF ALDOLASE: CPT

## 2024-03-14 PROCEDURE — 83519 RIA NONANTIBODY: CPT

## 2024-03-14 PROCEDURE — 86334 IMMUNOFIX E-PHORESIS SERUM: CPT

## 2024-03-14 PROCEDURE — 86431 RHEUMATOID FACTOR QUANT: CPT

## 2024-03-14 PROCEDURE — 80048 BASIC METABOLIC PNL TOTAL CA: CPT

## 2024-03-14 PROCEDURE — 84106 TEST FOR PORPHOBILINOGEN: CPT

## 2024-03-14 PROCEDURE — 86140 C-REACTIVE PROTEIN: CPT

## 2024-03-14 PROCEDURE — 86320 SERUM IMMUNOELECTROPHORESIS: CPT | Performed by: PSYCHIATRY & NEUROLOGY

## 2024-03-14 PROCEDURE — 82390 ASSAY OF CERULOPLASMIN: CPT

## 2024-03-14 PROCEDURE — 83921 ORGANIC ACID SINGLE QUANT: CPT

## 2024-03-14 PROCEDURE — 83918 ORGANIC ACIDS TOTAL QUANT: CPT

## 2024-03-15 LAB
ANA PATTERN: ABNORMAL
ANA SER QL HEP2 SUBST: POSITIVE
ANA TITR SER IF: ABNORMAL {TITER}
HCYS SERPL-SCNC: 9.47 UMOL/L (ref 5–13.9)

## 2024-03-16 LAB
ALDOLASE SERPL-CCNC: 3.8 U/L (ref 1.2–7.6)
COPPER SERPL-MCNC: 188.4 UG/DL (ref 80–155)
PCA IGG SER-ACNC: 1.9 UNITS (ref 0–24.9)
THYROGLOB AB SERPL-ACNC: <0.9 IU/ML (ref 0–4)

## 2024-03-17 LAB
3OH-DODECANOYLCARN SERPL-SCNC: 0.01 UMOL/L
3OH-ISOVALERYLCARN SERPL-SCNC: 0.03 UMOL/L
3OH-LINOLEOYLCARN SERPL-SCNC: 0.01 UMOL/L
3OH-OLEOYLCARN SERPL-SCNC: <0.01 UMOL/L
3OH-PALMITOLEYLCARN SERPL-SCNC: 0.01 UMOL/L
3OH-PALMITOYLCARN SERPL-SCNC: 0.01 UMOL/L
3OH-STEAROYLCARN SERPL-SCNC: <0.01 UMOL/L
3OH-TDECANOYLCARN SERPL-SCNC: <0.01 UMOL/L
3OH-TDECENOYLCARN SERPL-SCNC: 0.01 UMOL/L
ACETYLCARN SERPL-SCNC: 8.24 UMOL/L (ref 2.93–15.06)
ACYLCARNITINE PATTERN SERPL-IMP: ABNORMAL
BUTYRYL+ISOBUTYRYLCARN SERPL-SCNC: 0.12 UMOL/L
CARN ESTERS SERPL-SCNC: 9 UMOL/L (ref 5–29)
CARN ESTERS/C0 SERPL-SRTO: 0.2 RATIO (ref 0.1–1)
CARNITINE FREE SERPL-SCNC: 36 UMOL/L (ref 25–60)
CARNITINE SERPL-SCNC: 45 UMOL/L (ref 34–86)
DECANOYLCARN SERPL-SCNC: 0.91 UMOL/L
DECENOYLCARN SERPL-SCNC: 0.27 UMOL/L
DODECANOYLCARN SERPL-SCNC: 0.18 UMOL/L
DODECENOYLCARN SERPL-SCNC: 0.12 UMOL/L
GLUTARYLCARN SERPL-SCNC: 0.14 UMOL/L
HEXANOYLCARN SERPL-SCNC: 0.14 UMOL/L
ISOVALERYL+MEBUTYRYLCARN SERPL-SCNC: 0.08 UMOL/L
LINOLEOYLCARN SERPL-SCNC: 0.07 UMOL/L
M PNEUMO IGG SER IA-ACNC: 0 U/L
M PNEUMO IGM SER IA-ACNC: 0.54 U/L
OCTANOYLCARN SERPL-SCNC: 0.67 UMOL/L
OCTENOYLCARN SERPL-SCNC: 0.33 UMOL/L
OLEOYLCARN SERPL-SCNC: 0.13 UMOL/L
PALMITOLEYLCARN SERPL-SCNC: 0.06 UMOL/L
PALMITOYLCARN SERPL-SCNC: 0.12 UMOL/L
PROPIONYLCARN SERPL-SCNC: 0.33 UMOL/L
STEAROYLCARN SERPL-SCNC: 0.03 UMOL/L
TDECADIENOYLCARN SERPL-SCNC: 0.07 UMOL/L
TDECANOYLCARN SERPL-SCNC: 0.06 UMOL/L
TDECENOYLCARN SERPL-SCNC: 0.17 UMOL/L

## 2024-03-18 LAB
A-TOCOPHEROL VIT E SERPL-MCNC: 9.8 MG/L (ref 5.5–18)
ALBUMIN MFR UR ELPH: 24.8 %
ALPHA1 GLOB MFR UR ELPH: 14.6 %
ALPHA2 GLOB MFR UR ELPH: 14.4 %
B-GLOBULIN MFR UR ELPH: 33.9 %
BETA+GAMMA TOCOPHEROL SERPL-MCNC: 1.7 MG/L (ref 0–6)
GAMMA GLOB MFR UR ELPH: 12.3 %
IMMUNOFIXATION COMMENT: NORMAL
PATH REVIEW - URINE IMMUNOFIXATION: NORMAL
PATH REVIEW-URINE PROTEIN ELECTROPHORESIS: NORMAL
PBG UR-MCNC: 1.1 MG/L (ref 0–2)
PYRIDOXAL PHOS SERPL-SCNC: 14.4 NMOL/L (ref 20–125)
URINE ELECTROPHORESIS COMMENT: NORMAL

## 2024-03-19 LAB
ALBUMIN: 3.7 G/DL (ref 3.4–5)
ALPHA 1 GLOBULIN: 0.4 G/DL (ref 0.2–0.6)
ALPHA 2 GLOBULIN: 1 G/DL (ref 0.4–1.1)
AMPHIPHYSIN IGG SER QL IA: NEGATIVE
ANNOTATION COMMENT IMP: NORMAL
BETA GLOBULIN: 0.8 G/DL (ref 0.5–1.2)
CV2 AB SERPL QL IF: NEGATIVE
GAMMA GLOBULIN: 0.9 G/DL (ref 0.5–1.4)
GLIAL NUC TYPE 1 AB SER QL IF: NEGATIVE
HU1 AB SER QL: NEGATIVE
HU2 AB SER QL IF: NEGATIVE
HU3 AB SER QL: NEGATIVE
IMMUNOFIXATION COMMENT: NORMAL
PARANEOPLASTIC AB SER-IMP: NORMAL
PATH REVIEW - SERUM IMMUNOFIXATION: NORMAL
PATH REVIEW-SERUM PROTEIN ELECTROPHORESIS: NORMAL
PCA-1 AB SER QL IF: NEGATIVE
PCA-2 AB SER QL IF: NEGATIVE
PCA-TR AB SER QL IF: NEGATIVE
PROTEIN ELECTROPHORESIS COMMENT: NORMAL
VGCC-P/Q BIND IGG+IGM SER IA-SCNC: 0 NMOL/L
VGKC IGG+IGM SER IA-SCNC: 0 NMOL/L

## 2024-03-20 LAB — METHYLMALONATE SERPL-SCNC: <0.1 UMOL/L (ref 0–0.4)

## 2024-03-21 LAB
DOPAMINE SERPL-MCNC: <30 PG/ML (ref 0–48)
EPINEPH PLAS-MCNC: <15 PG/ML (ref 0–62)
NOREPINEPH PLAS-MCNC: 228 PG/ML (ref 0–874)
SCAN RESULT: NORMAL

## 2024-03-25 LAB
2OXO3ME-VALERATE/CREAT UR-SRTO: NOT DETECTED (ref 0–10)
2OXOISOCAPROATE/CREAT UR-SRTO: NOT DETECTED (ref 0–4)
2OXOISOVALERATE/CREAT UR-SRTO: NOT DETECTED (ref 0–4)
4OH-PHENYLACETATE/CREAT UR-SRTO: 5 (ref 0–25)
4OH-PHENYLLACTATE/CREAT UR-SRTO: NOT DETECTED (ref 0–4)
4OH-PHENYLPYRUVATE/CREAT UR-SRTO: NOT DETECTED (ref 0–2)
A-KETOGLUT/CREAT UR-SRTO: 9 (ref 0–75)
ACETOACET/CREAT UR-SRTO: NOT DETECTED (ref 0–4)
ADIPATE/CREAT UR-SRTO: 4 (ref 0–35)
B-OH-BUTYR/CREAT UR-SRTO: 1 (ref 0–4)
CREAT UR-MCNC: 255 MG/DL
ETHYLMALONATE/CREAT UR-SRTO: 1 (ref 0–4)
FUMARATE/CREAT UR-SRTO: NOT DETECTED (ref 0–4)
LACTATE/CREAT UR-SRTO: 49 (ref 0–50)
METHYLMALONATE/CREAT UR-SRTO: 1 (ref 0–5)
ORGANIC ACIDS PATTERN UR-IMP: NORMAL
PYRUVATE/CREAT UR-SRTO: 8 (ref 0–15)
SEBACATE/CREAT UR-SRTO: NOT DETECTED (ref 0–3)
SUBERATE/CREAT UR-SRTO: 2 (ref 0–3)
SUCCINATE/CREAT UR-SRTO: 10 (ref 0–20)
SUCCINYLACETONE/CREAT UR-SRTO: NOT DETECTED (ref 0–0)

## 2024-03-28 ENCOUNTER — APPOINTMENT (OUTPATIENT)
Dept: NEUROLOGY | Facility: HOSPITAL | Age: 29
End: 2024-03-28
Payer: COMMERCIAL

## 2024-06-10 DIAGNOSIS — M54.50 CHRONIC LOW BACK PAIN, UNSPECIFIED BACK PAIN LATERALITY, UNSPECIFIED WHETHER SCIATICA PRESENT: ICD-10-CM

## 2024-06-10 DIAGNOSIS — G89.29 CHRONIC LOW BACK PAIN, UNSPECIFIED BACK PAIN LATERALITY, UNSPECIFIED WHETHER SCIATICA PRESENT: ICD-10-CM

## 2024-06-13 ENCOUNTER — TELEPHONE (OUTPATIENT)
Dept: PAIN MEDICINE | Facility: CLINIC | Age: 29
End: 2024-06-13
Payer: COMMERCIAL

## 2024-06-19 ENCOUNTER — HOSPITAL ENCOUNTER (OUTPATIENT)
Dept: OPERATING ROOM | Facility: CLINIC | Age: 29
Discharge: HOME | End: 2024-06-19
Payer: COMMERCIAL

## 2024-06-19 VITALS
DIASTOLIC BLOOD PRESSURE: 76 MMHG | HEIGHT: 63 IN | TEMPERATURE: 97.9 F | SYSTOLIC BLOOD PRESSURE: 128 MMHG | WEIGHT: 293 LBS | RESPIRATION RATE: 16 BRPM | HEART RATE: 83 BPM | BODY MASS INDEX: 51.91 KG/M2 | OXYGEN SATURATION: 98 %

## 2024-06-19 DIAGNOSIS — M47.816 LUMBAR FACET JOINT SYNDROME: ICD-10-CM

## 2024-06-19 DIAGNOSIS — G89.29 CHRONIC LOW BACK PAIN, UNSPECIFIED BACK PAIN LATERALITY, UNSPECIFIED WHETHER SCIATICA PRESENT: ICD-10-CM

## 2024-06-19 DIAGNOSIS — M54.50 CHRONIC LOW BACK PAIN, UNSPECIFIED BACK PAIN LATERALITY, UNSPECIFIED WHETHER SCIATICA PRESENT: ICD-10-CM

## 2024-06-19 LAB — PREGNANCY TEST URINE, POC: NEGATIVE

## 2024-06-19 PROCEDURE — 64494 INJ PARAVERT F JNT L/S 2 LEV: CPT | Performed by: PAIN MEDICINE

## 2024-06-19 PROCEDURE — 64495 INJ PARAVERT F JNT L/S 3 LEV: CPT | Performed by: PAIN MEDICINE

## 2024-06-19 PROCEDURE — 2500000005 HC RX 250 GENERAL PHARMACY W/O HCPCS: Mod: SE | Performed by: PAIN MEDICINE

## 2024-06-19 PROCEDURE — 2550000001 HC RX 255 CONTRASTS: Mod: SE | Performed by: PAIN MEDICINE

## 2024-06-19 PROCEDURE — 81025 URINE PREGNANCY TEST: CPT | Performed by: PAIN MEDICINE

## 2024-06-19 PROCEDURE — 3600000006 HC OR TIME - EACH INCREMENTAL 1 MINUTE - PROCEDURE LEVEL ONE

## 2024-06-19 PROCEDURE — 2500000004 HC RX 250 GENERAL PHARMACY W/ HCPCS (ALT 636 FOR OP/ED): Mod: SE | Performed by: PAIN MEDICINE

## 2024-06-19 PROCEDURE — 7100000009 HC PHASE TWO TIME - INITIAL BASE CHARGE

## 2024-06-19 PROCEDURE — 3600000001 HC OR TIME - INITIAL BASE CHARGE - PROCEDURE LEVEL ONE

## 2024-06-19 PROCEDURE — 64493 INJ PARAVERT F JNT L/S 1 LEV: CPT | Performed by: PAIN MEDICINE

## 2024-06-19 PROCEDURE — 7100000010 HC PHASE TWO TIME - EACH INCREMENTAL 1 MINUTE

## 2024-06-19 RX ORDER — LIDOCAINE HYDROCHLORIDE 5 MG/ML
INJECTION, SOLUTION INFILTRATION; PERINEURAL AS NEEDED
Status: COMPLETED | OUTPATIENT
Start: 2024-06-19 | End: 2024-06-19

## 2024-06-19 RX ORDER — METHYLPREDNISOLONE ACETATE 40 MG/ML
INJECTION, SUSPENSION INTRA-ARTICULAR; INTRALESIONAL; INTRAMUSCULAR; SOFT TISSUE AS NEEDED
Status: COMPLETED | OUTPATIENT
Start: 2024-06-19 | End: 2024-06-19

## 2024-06-19 RX ORDER — METHYLPREDNISOLONE ACETATE 40 MG/ML
20 INJECTION, SUSPENSION INTRA-ARTICULAR; INTRALESIONAL; INTRAMUSCULAR; SOFT TISSUE ONCE
OUTPATIENT
Start: 2024-06-19 | End: 2024-06-19

## 2024-06-19 RX ORDER — MIDAZOLAM HYDROCHLORIDE 1 MG/ML
2 INJECTION, SOLUTION INTRAMUSCULAR; INTRAVENOUS ONCE
OUTPATIENT
Start: 2024-06-20 | End: 2024-06-20

## 2024-06-19 RX ORDER — OMALIZUMAB 300 MG/2ML
300 INJECTION, SOLUTION SUBCUTANEOUS
COMMUNITY

## 2024-06-19 RX ORDER — ROPIVACAINE HYDROCHLORIDE 5 MG/ML
10 INJECTION, SOLUTION EPIDURAL; INFILTRATION; PERINEURAL ONCE
OUTPATIENT
Start: 2024-06-19 | End: 2024-06-19

## 2024-06-19 RX ORDER — MIDAZOLAM HYDROCHLORIDE 1 MG/ML
INJECTION INTRAMUSCULAR; INTRAVENOUS AS NEEDED
Status: COMPLETED | OUTPATIENT
Start: 2024-06-19 | End: 2024-06-19

## 2024-06-19 ASSESSMENT — PAIN DESCRIPTION - DESCRIPTORS
DESCRIPTORS: ACHING
DESCRIPTORS: ACHING

## 2024-06-19 ASSESSMENT — PAIN SCALES - GENERAL
PAINLEVEL_OUTOF10: 8
PAINLEVEL_OUTOF10: 8
PAINLEVEL_OUTOF10: 6
PAINLEVEL_OUTOF10: 8

## 2024-06-19 ASSESSMENT — COLUMBIA-SUICIDE SEVERITY RATING SCALE - C-SSRS
6. HAVE YOU EVER DONE ANYTHING, STARTED TO DO ANYTHING, OR PREPARED TO DO ANYTHING TO END YOUR LIFE?: NO
2. HAVE YOU ACTUALLY HAD ANY THOUGHTS OF KILLING YOURSELF?: NO
1. IN THE PAST MONTH, HAVE YOU WISHED YOU WERE DEAD OR WISHED YOU COULD GO TO SLEEP AND NOT WAKE UP?: NO

## 2024-06-19 ASSESSMENT — PAIN - FUNCTIONAL ASSESSMENT
PAIN_FUNCTIONAL_ASSESSMENT: 0-10
PAIN_FUNCTIONAL_ASSESSMENT: 0-10

## 2024-06-19 ASSESSMENT — ACTIVITIES OF DAILY LIVING (ADL): EFFECT OF PAIN ON DAILY ACTIVITIES: UNABLE TO AMBULATE

## 2024-06-19 NOTE — H&P
HISTORY AND PHYSICAL    History Of Present Illness  Monique Bender is a 28 y.o. female presenting with chronic pain.  Here for  left diagnostic lumbar medial branch blocks    Past Medical History  Past Medical History:   Diagnosis Date    Cough, unspecified 12/08/2014    Cough    Irregular heart beat     Local infection of the skin and subcutaneous tissue, unspecified 02/11/2014    Pustule    Personal history of other diseases of the digestive system 09/15/2017    History of gastritis    Personal history of other diseases of the respiratory system 12/06/2015    History of sinusitis    Personal history of other diseases of the respiratory system 09/21/2016    History of bronchitis    Personal history of other diseases of the respiratory system 12/06/2015    History of acute pharyngitis    Personal history of other diseases of the respiratory system 12/08/2014    History of upper respiratory infection    Personal history of other specified conditions 09/21/2015    History of urinary frequency    Rash and other nonspecific skin eruption 11/11/2014    Rash    Sinusitis     Sprain of unspecified ligament of unspecified ankle, initial encounter 02/19/2016    Ankle sprain    Status post hysteroscopy     d&c and iud placement    SVT (supraventricular tachycardia) ([facility])     Unspecified abdominal pain 12/16/2014    Side pain    Unspecified asthma, uncomplicated ([facility]) 12/20/2013    Childhood asthma    Unspecified injury of right foot, initial encounter 02/24/2016    Right foot injury    Urinary tract infection, site not specified 09/21/2016    Acute UTI    Urinary tract infection, site not specified 06/03/2016    UTI (lower urinary tract infection)       Surgical History  Past Surgical History:   Procedure Laterality Date    ADENOIDECTOMY      COLONOSCOPY      TONSILLECTOMY  09/11/2013    Tonsillectomy With Adenoidectomy    UPPER GASTROINTESTINAL ENDOSCOPY          Social History  She reports that she has quit  "smoking. Her smoking use included cigarettes. She has never used smokeless tobacco. She reports that she does not currently use alcohol. She reports that she does not use drugs.    Family History  Family History   Problem Relation Name Age of Onset    Cancer Paternal Grandfather          Allergies  Cefdinir, Levofloxacin, Morphine, Prednisone, Amoxicillin-pot clavulanate, Penicillins, Prochlorperazine, Sulfamethoxazole-trimethoprim, Ciprofloxacin, Sulfa (sulfonamide antibiotics), Aluminum chloride hexahydrate, and Esomeprazole    Review of Systems   12 point ROS done and negative except for the above.   Physical Exam     General: NAD, well groomed, well nourished  Eyes: Non-icteric sclera, EOMI  Ears, Nose, Mouth, and Throat: External ears and nose appear to be without deformity or rash. No lesions or masses noted. Hearing is grossly intact.   Neck: Trachea midline  Respiratory: Nonlabored breathing   Cardiovascular: No peripheral edema   Skin: No rashes or open lesions/ulcers identified on skin.    Last Recorded Vitals  Blood pressure 130/68, pulse 84, temperature 36.8 °C (98.2 °F), temperature source Temporal, resp. rate 16, height 1.6 m (5' 3\"), weight (!) 172 kg (379 lb 3.1 oz), SpO2 97%.    Relevant Results           Assessment/Plan       Risks, benefits, alternatives discussed. All questions answered to the best of my ability. Patient agrees to proceed.   -We will proceed with planned procedure        Yung Dennison MD  Chronic Pain Fellow  [facility]    "

## 2024-06-19 NOTE — DISCHARGE INSTRUCTIONS
"Post-diagnostic block instructions:    You had a diagnostic block procedure today. The point was not necessarily to treat your pain, but to ensure we have diagnosed it correctly.   The pain relief you feel will only last as long as the numbing medication we injected--this will not likely give you long-term relief, only short term (only a couple hours to a day or so usually).   While it is working (while you are feeling pain relief), try to do the things that normally aggravate your pain--whether that is turning your head, shopping, walking around, gardening, etc.     Pay attention to how much pain relief (what percentage compared to before the procedure) you get and for how long it lasts. We will ask you for this at the follow up visit.     For insurance approval reasons, we usually need two successful diagnostic blocks prior to doing any nerve ablation, aka \"burning the nerve\".   For a block to be considered successful we need more than fifty percent pain relief after the procedure.     After we confirm the blocks are successful, the ablation will give you long term relief--usually around six months it is hard to predict how long you will feel benefit from any ablation.   If the ablation works well for you, you will not have to repeat diagnostic blocks before you have another ablation, if you need another one in the same area.     Activity: Avoid strenuous activity for 24 hours. But try to do mildly active things that would normally aggravate your pain. One day after the procedure, return to your normal activity level.     Bandages: Remove tomorrow    Showering/Bathing: You may shower after bandage is removed     Follow up: With Dr. Ortiz over the phone in one week to discuss how you are doing  Call Summer to Schedule.   Summer's Phone:  [phone]    After hours, call the   ([phone]) and ask for the pain management answering service if you notice any of the below:     Call the doctor immediately: " if you notice:    Excessive bleeding from procedure site (brisk bright red bleeding from the site or bleeding that soaks the bandages or does not stop)   Severe headache  Inability to walk, leg or arm weakness or numbness that is significantly worse after the procedure   Uncontrolled pain   Inability to control your bowels or bladder   Signs of infection: Fever above 101.5F, redness, swelling, pus or drainage from the site

## 2024-06-19 NOTE — PROCEDURES
Monique Bender is a 28 y.o. female  with lumbar spondylosis, left Bertolotti syndrome here for left medial branch block #2 with >80% pain relief with prior injection     Procedure performed: left lumbar medial branch blocks at L3, 4, 5 using fluoroscopic guidance    Indication: Lumbar spondylosis    Performed by: Dr. Ortiz  Assistant: Yung Dennison MD    The patient was identified in the preoperative holding area and informed consent was obtained.  The surgical area was marked according to protocol.  The patient was transported to the operating room on the [facility] and a timeout was conducted. ASA Standard monitors were applied.     The patient was transferred to the operating room table and placed in prone position.  X-ray guidance was used to ascertain the appropriate levels.  The site was prepped and draped in the usual fashion using ChloraPrep and sterile towels.  Skin anesthesia was achieved with 0.5% lidocaine with bicarbonate using a 27-gauge hypodermic needle.  22 gauge 7 inch quincke needles were advanced to the target areas using biplanar fluoroscopy.  1 mL of 0.5% ropivacaine were injected at each level with 40mg methylprednisolone divided amongst each level. The needles were removed.  Hemostasis was ensured.  A Band-Aid was applied.        The procedure was done without any evidence of complications and the patient was transported to the recovery area. The patient was discharged in stable condition.    Anesthesia: Local and  midazolam conscious sedation    All medications used were preservative free and confirmed to be not-.

## 2024-06-20 ASSESSMENT — PAIN SCALES - GENERAL: PAINLEVEL_OUTOF10: 2

## 2024-07-11 ENCOUNTER — APPOINTMENT (OUTPATIENT)
Dept: PAIN MEDICINE | Facility: CLINIC | Age: 29
End: 2024-07-11
Payer: COMMERCIAL

## 2024-07-11 DIAGNOSIS — M54.50 LUMBAR PAIN: ICD-10-CM

## 2024-07-11 PROCEDURE — 99213 OFFICE O/P EST LOW 20 MIN: CPT | Performed by: PAIN MEDICINE

## 2024-07-11 NOTE — PROGRESS NOTES
Subjective   Patient ID: Monique Bender is a 28 y.o. female who presents for left sided low back pain    HPI    Ms. Bender is pleasant 28 y  female with chief complain of left sided low back pain. Pt has been diagnosed with Ehler-Danlos syndrome. Also giant left L5 transverse process which kissing the left sacral ala. She had left side facet medial branch block twice. First time she got more than 80% pain relief for couple of weeks and same result with her second facet medial branch block. She is looking forward to have her ablation done for longer pain relief      Review of Systems   All other systems reviewed and are negative.         Current Outpatient Medications:     acetaminophen (Tylenol) 32 mg/mL suspension, Take by mouth., Disp: , Rfl:     cetirizine (ZYRTEC) 10 mg capsule, Take 1 capsule (10 mg) by mouth 1 time., Disp: , Rfl:     dexlansoprazole (Dexilant) 60 mg DR capsule, Take 1 capsule (60 mg) by mouth once daily., Disp: , Rfl:     ergocalciferol (Vitamin D-2) 1.25 MG (81858 UT) capsule, Take 1 capsule (1,250 mcg) by mouth 1 (one) time per week., Disp: , Rfl:     famotidine (Pepcid) 20 mg tablet, Take 1 tablet (20 mg) by mouth 2 times a day., Disp: , Rfl:     fremanezumab (Ajovy Autoinjector) 225 mg/1.5 mL auto-injector, 1 Pen (225 mg) every 28 (twenty-eight) days., Disp: , Rfl:     gabapentin (Neurontin) 600 mg tablet, Take 1 tablet (600 mg) by mouth once daily at bedtime., Disp: , Rfl:     hydrOXYzine HCL (Atarax) 50 mg tablet, Take 1 tablet (50 mg) by mouth. 3-4 TIMES DAILY., Disp: , Rfl:     lamoTRIgine (LaMICtal) 100 mg tablet, Take 1 tablet (100 mg) by mouth once daily at bedtime., Disp: , Rfl:     lamoTRIgine (LaMICtal) 25 mg tablet, Take 1 tablet (25 mg) by mouth once daily., Disp: , Rfl:     LORazepam (Ativan) 0.5 mg tablet, Take 1 tablet (0.5 mg) by mouth once daily as needed for anxiety., Disp: , Rfl:     LORazepam 4 mg/mL injection, LORazepam 4 MG/ML Injection Solution   Refills: 0      Start : 29-Aug-2017  Active, Disp: , Rfl:     norethindrone (Aygestin) 5 mg tablet, Take 1 tablet (5 mg) by mouth once daily., Disp: , Rfl:     norethindrone-ethin estradioL (Ovcon) 0.4-35 mg-mcg tablet, Take 1 tablet by mouth once daily., Disp: , Rfl:     omalizumab (Xolair) 300 mg/2 mL subcutaneous syringe, Inject 2 mL (300 mg) under the skin., Disp: , Rfl:     omeprazole (PriLOSEC) 40 mg DR capsule, Take 1 capsule (40 mg) by mouth 2 times a day., Disp: , Rfl:     ondansetron (Zofran) 4 mg tablet, Take 1 tablet (4 mg) by mouth 2 times a day as needed for nausea., Disp: , Rfl:     ondansetron ODT (Zofran-ODT) 4 mg disintegrating tablet, Take 1 tablet (4 mg) by mouth every 8 hours if needed., Disp: , Rfl:     propranolol (Inderal) 20 mg tablet, Take 1 tablet (20 mg) by mouth 3 times a day., Disp: , Rfl:     sertraline (Zoloft) 100 mg tablet, Take 1 tablet (100 mg) by mouth once daily at bedtime. Take with 25 mg tab for total dose of 125 mg, Disp: , Rfl:     sertraline (Zoloft) 25 mg tablet, Take 1 tablet (25 mg) by mouth once daily at bedtime., Disp: , Rfl:      Past Medical History:   Diagnosis Date    Cough, unspecified 12/08/2014    Cough    Irregular heart beat     Local infection of the skin and subcutaneous tissue, unspecified 02/11/2014    Pustule    Personal history of other diseases of the digestive system 09/15/2017    History of gastritis    Personal history of other diseases of the respiratory system 12/06/2015    History of sinusitis    Personal history of other diseases of the respiratory system 09/21/2016    History of bronchitis    Personal history of other diseases of the respiratory system 12/06/2015    History of acute pharyngitis    Personal history of other diseases of the respiratory system 12/08/2014    History of upper respiratory infection    Personal history of other specified conditions 09/21/2015    History of urinary frequency    Rash and other nonspecific skin eruption  11/11/2014    Rash    Sinusitis     Sprain of unspecified ligament of unspecified ankle, initial encounter 02/19/2016    Ankle sprain    Status post hysteroscopy     d&c and iud placement    SVT (supraventricular tachycardia) (CMS-HCC)     Unspecified abdominal pain 12/16/2014    Side pain    Unspecified asthma, uncomplicated ([facility]) 12/20/2013    Childhood asthma    Unspecified injury of right foot, initial encounter 02/24/2016    Right foot injury    Urinary tract infection, site not specified 09/21/2016    Acute UTI    Urinary tract infection, site not specified 06/03/2016    UTI (lower urinary tract infection)        Past Surgical History:   Procedure Laterality Date    ADENOIDECTOMY      COLONOSCOPY      TONSILLECTOMY  09/11/2013    Tonsillectomy With Adenoidectomy    UPPER GASTROINTESTINAL ENDOSCOPY          Family History   Problem Relation Name Age of Onset    Cancer Paternal Grandfather          Allergies   Allergen Reactions    Cefdinir Anaphylaxis and Hives    Levofloxacin Myalgia and Other    Morphine Other    Prednisone Other    Amoxicillin-Pot Clavulanate Hives    Penicillins Hives    Prochlorperazine Agitation, Anxiety and Other    Sulfamethoxazole-Trimethoprim Hives    Ciprofloxacin Other    Sulfa (Sulfonamide Antibiotics) GI Upset    Aluminum Chloride Hexahydrate Rash    Esomeprazole Rash        Objective     There were no vitals filed for this visit.          Physical Exam    Virtual interview        Physical exam as above except:        Assessment/Plan   Lumbar facet syndrome  Ehler-Danlos Syndrome      Plan    Left side RF  left lumbar facet

## 2024-07-30 NOTE — PROCEDURES
Monique Bender is a 28 y.o. female  with  lumbar spondylosis here for lumbar medial branch radiofrequency ablations.      Procedure performed: *left lumbar medial branch radiofrequency ablations at L3, 4, 5 using fluoroscopic guidance    Indication: lumbar spondylosis with greater than 80% pain relief on diagnostic medial branch blocks x2    Performed by: Dr. Ortiz  Assistant: Osmany Calderon DO    The patient was identified in the preoperative holding area and informed consent was obtained.  The surgical area was marked according to protocol.  The patient was transported to the operating room on the [facility] and a timeout was conducted. ASA Standard monitors were applied.     The patient was transferred to the operating room table and placed in prone position.  X-ray guidance was used to ascertain the appropriate levels.  The site was prepped and draped in the usual fashion using ChloraPrep and sterile towels.  Skin anesthesia was achieved with 0.5% lidocaine with bicarbonate using a 27-gauge hypodermic needle.  150mm insulated radiofrequency needles with 10 mm active tips were advanced to the target areas using fluoroscopy and biplanar images.  Sensory and motor testing was conducted at all 3 levels with appropriate response. 1.5 mL of ropivacaine were injected at each level and a 3-minute timer was started to make sure proper analgesia.  We then connected the radiofrequency neurotomy at 90 °C for 1 minute 30 seconds without issue.  Subsequently, we injected 3 mg of dexamethasone at each level.  The needles were removed. Hemostasis was ensured.  Band-Aids were applied.     The procedure was done without any evidence of complications and the patient was transported to the recovery area.  There were no apparent complications.  The patient was discharged in stable condition.    Anesthesia: Local and midazolam conscious sedation      Osmany Calderon DO  Pain Fellow

## 2024-07-30 NOTE — H&P
Pain Management H&P    History Of Present Illness  Monique Bender is a 28 y.o. female presents for procedure state below. Endorses no changes in past medical history or medical health since last seen in clinic. Has had two successful diagnostic lumbar MBB's. Of note, patient had COVID 10 days ago, no residual symptoms.      Past Medical History  She has a past medical history of Cough, unspecified (12/08/2014), Irregular heart beat, Local infection of the skin and subcutaneous tissue, unspecified (02/11/2014), Personal history of other diseases of the digestive system (09/15/2017), Personal history of other diseases of the respiratory system (12/06/2015), Personal history of other diseases of the respiratory system (09/21/2016), Personal history of other diseases of the respiratory system (12/06/2015), Personal history of other diseases of the respiratory system (12/08/2014), Personal history of other specified conditions (09/21/2015), Rash and other nonspecific skin eruption (11/11/2014), Sinusitis, Sprain of unspecified ligament of unspecified ankle, initial encounter (02/19/2016), Status post hysteroscopy, SVT (supraventricular tachycardia) (CMS-HCC), Unspecified abdominal pain (12/16/2014), Unspecified asthma, uncomplicated ([facility]) (12/20/2013), Unspecified injury of right foot, initial encounter (02/24/2016), Urinary tract infection, site not specified (09/21/2016), and Urinary tract infection, site not specified (06/03/2016).    Surgical History  She has a past surgical history that includes Tonsillectomy (09/11/2013); Adenoidectomy; Colonoscopy; and Upper gastrointestinal endoscopy.     Social History  She reports that she has quit smoking. Her smoking use included cigarettes. She has never used smokeless tobacco. She reports that she does not currently use alcohol. She reports that she does not use drugs.    Family History  Family History   Problem Relation Name Age of Onset    Cancer Paternal  Grandfather          Allergies  Cefdinir, Levofloxacin, Morphine, Prednisone, Amoxicillin-pot clavulanate, Penicillins, Prochlorperazine, Sulfamethoxazole-trimethoprim, Ciprofloxacin, Sulfa (sulfonamide antibiotics), Aluminum chloride hexahydrate, and Esomeprazole    Review of Symptoms:   Constitutional: Negative for chills, diaphoresis or fever  HENT: Negative for neck swelling  Eyes:.  Negative for eye pain  Respiratory:.  Negative for cough, shortness of breath or wheezing    Cardiovascular:.  Negative for chest pain or palpitations  Gastrointestinal:.  Negative for abdominal pain, nausea and vomiting  Genitourinary:.  Negative for urgency  Musculoskeletal:  Positive for back pain. Positive for joint pain. Denies falls within the past 3 months.  Skin: Negative for wounds or itching   Neurological: Negative for dizziness, seizures, loss of consciousness and weakness  Endo/Heme/Allergies: Does not bruise/bleed easily  Psychiatric/Behavioral: Negative for depression. The patient does not appear anxious.       PHYSICAL EXAM  Vitals signs reviewed  Constitutional:       General: Not in acute distress     Appearance: Normal appearance. Not ill-appearing.  HENT:     Head: Normocephalic and atraumatic  Eyes:     Conjunctiva/sclera: Conjunctivae normal  Cardiovascular:     Rate and Rhythm: Normal rate and regular rhythm  Pulmonary:     Effort: No respiratory distress  Abdominal:     Palpations: Abdomen is soft  Musculoskeletal: WOODWARD  Skin:     General: Skin is warm and dry  Neurological:     General: No focal deficit present  Psychiatric:         Mood and Affect: Mood normal         Behavior: Behavior normal     Last Recorded Vitals  There were no vitals taken for this visit.    Relevant Results  Current Outpatient Medications   Medication Instructions    acetaminophen (Tylenol) 32 mg/mL suspension oral    Ajovy Autoinjector 225 mg, Every 28 days    cetirizine (ZYRTEC) 10 mg, oral, Once    dexlansoprazole (DEXILANT) 60 mg,  oral, Daily    ergocalciferol (Vitamin D-2) 1.25 MG (37374 UT) capsule 1 capsule, oral, Once Weekly    famotidine (PEPCID) 20 mg, oral, 2 times daily    gabapentin (NEURONTIN) 600 mg, oral, Nightly    hydrOXYzine HCL (Atarax) 50 mg tablet 1 tablet, oral, 3-4 TIMES DAILY.    lamoTRIgine (LAMICTAL) 25 mg, oral, Daily    lamoTRIgine (LAMICTAL) 100 mg, oral, Nightly    LORazepam (ATIVAN) 0.5 mg, oral, Daily PRN    LORazepam 4 mg/mL injection LORazepam 4 MG/ML Injection Solution   Refills: 0        Start : 29-Aug-2017   Active    norethindrone (AYGESTIN) 5 mg, oral, Daily RT    norethindrone-ethin estradioL (Ovcon) 0.4-35 mg-mcg tablet 1 tablet, oral, Daily    omeprazole (PriLOSEC) 40 mg DR capsule 1 capsule, oral, 2 times daily    ondansetron (Zofran) 4 mg tablet 1 tablet, oral, 2 times daily PRN    ondansetron ODT (Zofran-ODT) 4 mg disintegrating tablet 1 tablet, oral, Every 8 hours PRN    propranolol (INDERAL) 20 mg, oral, 3 times daily    sertraline (ZOLOFT) 25 mg, oral, Nightly    sertraline (ZOLOFT) 100 mg, oral, Nightly, Take with 25 mg tab for total dose of 125 mg    Xolair 300 mg, subcutaneous         MR lumbar spine wo IV contrast 09/26/2022    Narrative  * * *Final Report* * *    DATE OF EXAM: Sep 26 2022  8:06AM    [facility]   0303  -  MRI LUMBAR SPINE WO IVCON  / ACCESSION #  [id number]    PROCEDURE REASON: DDD (degenerative disc disease), lumbosacral    * * * * Physician Interpretation * * * *    EXAMINATION:  MRI LUMBAR SPINE WO IVCON    CLINICAL HISTORY:  DDD (degenerative disc disease), lumbosacral      TECHNIQUE: Routine lumbosacral spine MR protocol without gadolinium.  MQ:  [id number]    COMPARISON: Lumbar spine radiographs 08/24/2022      RESULT:    Counting reference:  Lumbosacral junction.  For the purposes of this  report,  L4-5 is considered the level of the iliac crest and there are 5  lumbar-type vertebrae.  Anatomic variant: Transitional L5 vertebral body,  with enlarged left transverse processes  demonstrating pseudoarticulation  with the sacrum.  No significant marrow edema adjacent to the  pseudoarticulation, although only partially imaged.    Localizer images:  No additional findings.    Alignment:    Alignment is anatomic.    Bone marrow signal/fracture: Small intraosseous hemangioma in the T11  vertebral body.  No evidence of pathologic marrow infiltration.  No  evidence of prior fracture.    Conus:  The conus is within normal limits of signal intensity and  morphology.    Paraspinal soft tissues:   Paraspinal soft tissues are within normal  limits.    Lower thoracic spine:  Visualized lower thoracic canal and foramina are  patent.    T12-L1:  Canal and foramina are patent.    L1-L2:    Canal and foramina are patent.    L2-L3:    Canal and foramina are patent    L3-L4:    Canal and foramina are patent    L4-L5:    Minimal disc desiccation, however without significant disc  bulge or disc protrusion evident.  Minimal facet arthropathy with trace  left facet joint effusion.  Canal and foramina are patent.    L5-S1:    Transitional level as described above.  Canal and foramina are  patent.    Sacrum and iliac wings:   The visualized sacrum and iliac wings are  within normal limits.    Impression  IMPRESSION:    Transitional L5 vertebra, with an enlarged left transverse process which  demonstrates pseudoarticulation with the sacrum.  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  No adjacent marrow edema noted  on the current examination.  Personal review of imaging recommended prior  to any possible intervention.    Mild degenerative changes at L4-5 with disc desiccation and facet  arthropathy with trace left facet joint effusion.  Canal and foramina  remain patent.    Otherwise unremarkable MRI appearance of the lumbar spine.  No  significant canal or foraminal narrowing.    Anatomic Thoracic/Lumbar Variant: Transitional L5 vertebral body.  L4-5  is considered the level of the iliac  crest and there are 5 lumbar-type  vertebrae.                      : YANELY  Transcribe Date/Time: Sep 26 2022  8:48A    Dictated by : GAUDENCIO QUINONES MD    This examination was interpreted and the report reviewed and  electronically signed by:  GAUDENCIO QUINONES MD on Sep 26 2022  9:14AM  EST     No image results found.     No diagnosis found.     ASSESSMENT/PLAN  Monique Bender is a 28 y.o. female here for left RFA L3-4, L4-5, DR 5     Patient denies any recent antibiotic use or infections, denies any blood thinner use, and denies contrast or local anesthetic allergies     Risks, benefits, alternatives discussed. All questions answered to the best of my ability. Patient agrees to proceed.     COVID (+) 10 days ago. Asymptomatic at this time. Will proceed with procedure under minimal sedation.      Our plan is as follows:  - Proceed with aforementioned procedure          Osmany Calderon DO   Pain fellow

## 2024-07-31 ENCOUNTER — HOSPITAL ENCOUNTER (OUTPATIENT)
Dept: OPERATING ROOM | Facility: CLINIC | Age: 29
Setting detail: OUTPATIENT SURGERY
Discharge: HOME | End: 2024-07-31
Payer: COMMERCIAL

## 2024-07-31 VITALS
DIASTOLIC BLOOD PRESSURE: 67 MMHG | HEIGHT: 63 IN | HEART RATE: 94 BPM | OXYGEN SATURATION: 99 % | BODY MASS INDEX: 51.91 KG/M2 | RESPIRATION RATE: 16 BRPM | TEMPERATURE: 98.6 F | SYSTOLIC BLOOD PRESSURE: 118 MMHG | WEIGHT: 293 LBS

## 2024-07-31 DIAGNOSIS — M54.50 LUMBAR PAIN: ICD-10-CM

## 2024-07-31 PROCEDURE — 2500000005 HC RX 250 GENERAL PHARMACY W/O HCPCS: Mod: SE | Performed by: PAIN MEDICINE

## 2024-07-31 PROCEDURE — 2500000004 HC RX 250 GENERAL PHARMACY W/ HCPCS (ALT 636 FOR OP/ED): Mod: SE | Performed by: PAIN MEDICINE

## 2024-07-31 PROCEDURE — 3600000006 HC OR TIME - EACH INCREMENTAL 1 MINUTE - PROCEDURE LEVEL ONE

## 2024-07-31 PROCEDURE — 3600000001 HC OR TIME - INITIAL BASE CHARGE - PROCEDURE LEVEL ONE

## 2024-07-31 PROCEDURE — 64636 DESTROY L/S FACET JNT ADDL: CPT | Performed by: PAIN MEDICINE

## 2024-07-31 PROCEDURE — 2720000007 HC OR 272 NO HCPCS

## 2024-07-31 PROCEDURE — 64635 DESTROY LUMB/SAC FACET JNT: CPT | Performed by: PAIN MEDICINE

## 2024-07-31 PROCEDURE — 7100000009 HC PHASE TWO TIME - INITIAL BASE CHARGE

## 2024-07-31 PROCEDURE — 7100000010 HC PHASE TWO TIME - EACH INCREMENTAL 1 MINUTE

## 2024-07-31 RX ORDER — METHYLPREDNISOLONE ACETATE 40 MG/ML
INJECTION, SUSPENSION INTRA-ARTICULAR; INTRALESIONAL; INTRAMUSCULAR; SOFT TISSUE AS NEEDED
Status: COMPLETED | OUTPATIENT
Start: 2024-07-31 | End: 2024-07-31

## 2024-07-31 RX ORDER — SODIUM BICARBONATE 42 MG/ML
INJECTION, SOLUTION INTRAVENOUS AS NEEDED
Status: COMPLETED | OUTPATIENT
Start: 2024-07-31 | End: 2024-07-31

## 2024-07-31 RX ORDER — LIDOCAINE HYDROCHLORIDE 5 MG/ML
INJECTION, SOLUTION INFILTRATION; INTRAVENOUS AS NEEDED
Status: COMPLETED | OUTPATIENT
Start: 2024-07-31 | End: 2024-07-31

## 2024-07-31 RX ORDER — MIDAZOLAM HYDROCHLORIDE 1 MG/ML
INJECTION, SOLUTION INTRAMUSCULAR; INTRAVENOUS AS NEEDED
Status: COMPLETED | OUTPATIENT
Start: 2024-07-31 | End: 2024-07-31

## 2024-07-31 RX ORDER — BUPIVACAINE HYDROCHLORIDE 5 MG/ML
INJECTION, SOLUTION EPIDURAL; INTRACAUDAL AS NEEDED
Status: COMPLETED | OUTPATIENT
Start: 2024-07-31 | End: 2024-07-31

## 2024-07-31 ASSESSMENT — ENCOUNTER SYMPTOMS
DEPRESSION: 0
OCCASIONAL FEELINGS OF UNSTEADINESS: 0
LOSS OF SENSATION IN FEET: 0

## 2024-07-31 ASSESSMENT — COLUMBIA-SUICIDE SEVERITY RATING SCALE - C-SSRS
1. IN THE PAST MONTH, HAVE YOU WISHED YOU WERE DEAD OR WISHED YOU COULD GO TO SLEEP AND NOT WAKE UP?: NO
2. HAVE YOU ACTUALLY HAD ANY THOUGHTS OF KILLING YOURSELF?: NO
6. HAVE YOU EVER DONE ANYTHING, STARTED TO DO ANYTHING, OR PREPARED TO DO ANYTHING TO END YOUR LIFE?: NO

## 2024-07-31 ASSESSMENT — PAIN SCALES - GENERAL
PAINLEVEL_OUTOF10: 7
PAINLEVEL_OUTOF10: 7
PAINLEVEL_OUTOF10: 4
PAINLEVEL_OUTOF10: 7

## 2024-07-31 ASSESSMENT — PATIENT HEALTH QUESTIONNAIRE - PHQ9
1. LITTLE INTEREST OR PLEASURE IN DOING THINGS: NOT AT ALL
2. FEELING DOWN, DEPRESSED OR HOPELESS: NOT AT ALL
SUM OF ALL RESPONSES TO PHQ9 QUESTIONS 1 AND 2: 0

## 2024-07-31 ASSESSMENT — PAIN - FUNCTIONAL ASSESSMENT
PAIN_FUNCTIONAL_ASSESSMENT: 0-10

## 2024-07-31 NOTE — DISCHARGE INSTRUCTIONS
OUTPATIENT SURGERY CENTER DISCHARGE INSTRUCTIONS FOR ANESTHESIOLOGY PAIN SERVICE PATIENTS     Dr. Ortiz     PLEASE CAREFULLY FOLLOW THE INSTRUCTIONS    If you received sedation for your procedure NO DRIVING, NO DRINKING ALCOHOL, TAKING SEDATIVE MEDICATIONS, OR ANY IMPORTANT DECISION MAKING FOR 24 HOURS.     Follow up call in 2 weeks to report how your condition is with Crista.  Crista's Phone Number [phone]    Your pain may not be gone immediately after this procedure; it generally takes 3 to 5 days for the steroid to work.     Keep the needle site clean and dry for 24 hours.    Observe the needle site for excessive bleeding (slow general oozing that completely soaks the dressing or fresh bright red bleeding).    In either case, apply pressure to the area, elevate it if possible and call your doctor at once.    Take medicines as directed.    You may remove the bandage after 24 hours and shower at that time.      Observe/monitor for the following signs and symptoms:    Needle site: for Change in color, Numbness or tingling    Coldness to touch, Swelling, Drainage    Temperature of 101.5 or higher    Increased or uncontrollable pain   If you notice any of the above signs and symptoms, please call your doctor at once.       If any problems occur, or if you have any further questions, please call your doctor as soon as possible.     If you find that you cannot reach your doctor, but feel that your condition needs a doctor's attention   Call the after hours phone number  [phone] ask for Pain Management Resident to be paged: pager Number [phone] Or go to the nearest Emergency Room

## 2024-08-15 ENCOUNTER — HOSPITAL ENCOUNTER (OUTPATIENT)
Dept: RADIOLOGY | Facility: CLINIC | Age: 29
Discharge: HOME | End: 2024-08-15
Payer: COMMERCIAL

## 2024-08-15 ENCOUNTER — APPOINTMENT (OUTPATIENT)
Dept: PAIN MEDICINE | Facility: CLINIC | Age: 29
End: 2024-08-15
Payer: COMMERCIAL

## 2024-08-15 DIAGNOSIS — M79.602 PAIN IN BOTH UPPER EXTREMITIES: ICD-10-CM

## 2024-08-15 DIAGNOSIS — M79.602 ARM PAIN, ANTERIOR, LEFT: ICD-10-CM

## 2024-08-15 DIAGNOSIS — M79.601 PAIN IN BOTH UPPER EXTREMITIES: ICD-10-CM

## 2024-08-15 PROCEDURE — 73080 X-RAY EXAM OF ELBOW: CPT | Mod: RIGHT SIDE | Performed by: STUDENT IN AN ORGANIZED HEALTH CARE EDUCATION/TRAINING PROGRAM

## 2024-08-15 PROCEDURE — 73080 X-RAY EXAM OF ELBOW: CPT | Mod: RT

## 2024-08-15 PROCEDURE — 99214 OFFICE O/P EST MOD 30 MIN: CPT | Performed by: PAIN MEDICINE

## 2024-08-15 PROCEDURE — 73080 X-RAY EXAM OF ELBOW: CPT | Mod: LT

## 2024-08-15 SDOH — SOCIAL STABILITY: SOCIAL NETWORK: SOCIAL ACTIVITY:: 5

## 2024-08-15 ASSESSMENT — PAIN SCALES - GENERAL
PAINLEVEL_OUTOF10: 4
PAINLEVEL: 4

## 2024-08-15 ASSESSMENT — PAIN DESCRIPTION - DESCRIPTORS: DESCRIPTORS: SHOOTING;SHARP

## 2024-08-15 ASSESSMENT — PAIN - FUNCTIONAL ASSESSMENT: PAIN_FUNCTIONAL_ASSESSMENT: 0-10

## 2024-08-15 NOTE — PROGRESS NOTES
Pain Management Clinic Note     History Of Present Illness  Monique Bender is a 28 y.o. female with a past medical history of migraine, arthralgia, headache, chronic low back pain, and CHALO who is presenting for follow-up evaluation of low back pain.  Patient was last seen on 07/31/2024 where she presented for left medial branch radiofrequency ablations at L3-L4 and L5.  Since that time the patient has reported 75% relief of her symptoms.    Today the patient comes in to discuss bilateral shoulder pain that is present for the patient as her arms bent for extended period of time.  She states that the pain weekly worse at night when she sleeps with her arms bent underneath.  And frequently wakes her up from sleep.  The pain is typically worse on the right but today is worse than the left.  The patient does note some residual ulnar distribution of her arm in the morning when she wakes up which slowly goes away throughout the day.  She also noticed the pain if she is on her phone for extended period of time with her arms back.  She is noticing no sensory loss in her hands, in no balance changes or loss of dexterity.  She states that her gabapentin that she takes at night may help the pain she thinks is worse without, but it does not help significantly.  The patient has had no imaging of the elbows previously.     The pain does impact the patients ability to complete tasks and activities that they were previously able to complete.     Most recent imaging MRI lumbar spine: 9/26/2022  IMPRESSION:     Transitional L5 vertebra, with an enlarged left transverse process which   demonstrates pseudoarticulation with the sacrum.  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  No adjacent marrow edema noted   on the current examination.  Personal review of imaging recommended prior   to any possible intervention.     Mild degenerative changes at L4-5 with disc desiccation and facet   arthropathy with  trace left facet joint effusion.  Canal and foramina   remain patent.     Otherwise unremarkable MRI appearance of the lumbar spine.  No   significant canal or foraminal narrowing.     Anatomic Thoracic/Lumbar Variant: Transitional L5 vertebral body.  L4-5   is considered the level of the iliac crest and there are 5 lumbar-type   vertebrae.       Previous treatments include:   Medications: Gabapentin 600 mg once daily at bedtime   Physical Therapy: Previously completed   Injections: Left L3-L4-L5 RFA   Surgery: None      The pain causes significant stress in the patient's life, specifically interferes with general activity, mood, walking ability, ability to perform tasks at home and/or work.  Patient participates in physical therapy and continues to perform physician directed exercises at home. Denies any bowel or bladder incontinence, saddle anesthesia, worsening pain, weakness or falls.     Past Medical History  She has a past medical history of Cough, unspecified (12/08/2014), Irregular heart beat, Local infection of the skin and subcutaneous tissue, unspecified (02/11/2014), Personal history of other diseases of the digestive system (09/15/2017), Personal history of other diseases of the respiratory system (12/06/2015), Personal history of other diseases of the respiratory system (09/21/2016), Personal history of other diseases of the respiratory system (12/06/2015), Personal history of other diseases of the respiratory system (12/08/2014), Personal history of other specified conditions (09/21/2015), Rash and other nonspecific skin eruption (11/11/2014), Sinusitis, Sprain of unspecified ligament of unspecified ankle, initial encounter (02/19/2016), Status post hysteroscopy, SVT (supraventricular tachycardia) (CMS-HCC), Unspecified abdominal pain (12/16/2014), Unspecified asthma, uncomplicated ([facility]) (12/20/2013), Unspecified injury of right foot, initial encounter (02/24/2016), Urinary tract infection, site  not specified (09/21/2016), and Urinary tract infection, site not specified (06/03/2016).    Surgical History  She has a past surgical history that includes Tonsillectomy (09/11/2013); Adenoidectomy; Colonoscopy; Upper gastrointestinal endoscopy; and Hysteroscopy.     Social History  She reports that she has quit smoking. Her smoking use included cigarettes. She has never used smokeless tobacco. She reports that she does not currently use alcohol. She reports that she does not use drugs.    Family History  Family History   Problem Relation Name Age of Onset    Cancer Paternal Grandfather          Allergies  Cefdinir, Levofloxacin, Morphine, Prednisone, Prochlorperazine, Amoxicillin-pot clavulanate, Penicillins, Sulfamethoxazole-trimethoprim, Ciprofloxacin, Sulfa (sulfonamide antibiotics), Aluminum chloride hexahydrate, and Esomeprazole    Review of Symptoms:   Constitutional: Negative for chills, diaphoresis or fever  HENT: Negative for neck swelling  Eyes:.  Negative for eye pain  Respiratory:.  Negative for cough, shortness of breath or wheezing    Cardiovascular:.  Negative for chest pain or palpitations  Gastrointestinal:.  Negative for abdominal pain, nausea and vomiting  Genitourinary:.  Negative for urgency  Musculoskeletal:  Positive for back pain. Positive for joint pain. Denies falls within the past 3 months.  Skin: Negative for wounds or itching   Neurological: Negative for dizziness, seizures, loss of consciousness and weakness  Endo/Heme/Allergies: Does not bruise/bleed easily  Psychiatric/Behavioral: Negative for depression. The patient does not appear anxious.       Physical Exam  Constitutional:       General: She is not in acute distress.     Appearance: Normal appearance.   HENT:      Head: Normocephalic.   Cardiovascular:      Rate and Rhythm: Normal rate and regular rhythm.   Pulmonary:      Effort: Pulmonary effort is normal. No respiratory distress.   Musculoskeletal:         General: Normal  range of motion.      Right elbow: No deformity or effusion. No tenderness.      Left elbow: No deformity or effusion. No tenderness.      Cervical back: Normal range of motion.   Skin:     General: Skin is warm and dry.   Neurological:      General: No focal deficit present.      Mental Status: She is alert and oriented to person, place, and time.      Sensory: No sensory deficit.      Motor: No weakness.      Gait: Gait normal.   Psychiatric:         Mood and Affect: Mood normal.        Advanced Exam   Inspection: No gross deformities, no surgical scars  Palpation: No tenderness of patient of lumbar midline, lumbar paraspinals, bilateral SI joints  ROM: Normal range of motion of the lumbar flexion extension  Motor: 5/5 strength upper and lower extremities  Sensory: Negative for sensory abnormalities in upper and lower extremities  Reflexes: 2+ reflexes bilateral upper extremities  Cervical: Michel sign negative bilaterally      Last Recorded Vitals  There were no vitals taken for this visit.    Relevant Results  Current Outpatient Medications   Medication Instructions    Ajovy Autoinjector 225 mg, Every 28 days    cetirizine (ZYRTEC) 10 mg, oral, Once    dexlansoprazole (DEXILANT) 60 mg, oral, Daily    ergocalciferol (Vitamin D-2) 1.25 MG (40544 UT) capsule 1 capsule, oral, Once Weekly    famotidine (PEPCID) 20 mg, oral, 2 times daily    gabapentin (NEURONTIN) 600 mg, oral, Nightly    hydrOXYzine HCL (Atarax) 50 mg tablet 1 tablet, oral, 3-4 TIMES DAILY.    lamoTRIgine (LAMICTAL) 100 mg, oral, Nightly    LORazepam (ATIVAN) 0.5 mg, oral, Daily PRN    norethindrone (AYGESTIN) 5 mg, oral, Daily RT    omeprazole (PriLOSEC) 40 mg DR capsule 1 capsule, oral, 2 times daily    ondansetron (Zofran) 4 mg tablet 1 tablet, oral, 2 times daily PRN    ondansetron ODT (Zofran-ODT) 4 mg disintegrating tablet 1 tablet, oral, Every 8 hours PRN    propranolol (INDERAL) 20 mg, oral, 3 times daily    sertraline (ZOLOFT) 100 mg,  oral, Nightly, Take with 25 mg tab for total dose of 125 mg    Xolair 300 mg, subcutaneous         MR lumbar spine wo IV contrast 09/26/2022    Narrative  * * *Final Report* * *    DATE OF EXAM: Sep 26 2022  8:06AM    BC[facility]   0303  -  MRI LUMBAR SPINE WO IVCON  / ACCESSION #  [id number]    PROCEDURE REASON: DDD (degenerative disc disease), lumbosacral    * * * * Physician Interpretation * * * *    EXAMINATION:  MRI LUMBAR SPINE WO IVCON    CLINICAL HISTORY:  DDD (degenerative disc disease), lumbosacral      TECHNIQUE: Routine lumbosacral spine MR protocol without gadolinium.  MQ:  [id number]    COMPARISON: Lumbar spine radiographs 08/24/2022      RESULT:    Counting reference:  Lumbosacral junction.  For the purposes of this  report,  L4-5 is considered the level of the iliac crest and there are 5  lumbar-type vertebrae.  Anatomic variant: Transitional L5 vertebral body,  with enlarged left transverse processes demonstrating pseudoarticulation  with the sacrum.  No significant marrow edema adjacent to the  pseudoarticulation, although only partially imaged.    Localizer images:  No additional findings.    Alignment:    Alignment is anatomic.    Bone marrow signal/fracture: Small intraosseous hemangioma in the T11  vertebral body.  No evidence of pathologic marrow infiltration.  No  evidence of prior fracture.    Conus:  The conus is within normal limits of signal intensity and  morphology.    Paraspinal soft tissues:   Paraspinal soft tissues are within normal  limits.    Lower thoracic spine:  Visualized lower thoracic canal and foramina are  patent.    T12-L1:  Canal and foramina are patent.    L1-L2:    Canal and foramina are patent.    L2-L3:    Canal and foramina are patent    L3-L4:    Canal and foramina are patent    L4-L5:    Minimal disc desiccation, however without significant disc  bulge or disc protrusion evident.  Minimal facet arthropathy with trace  left facet joint effusion.  Canal and foramina are  patent.    L5-S1:    Transitional level as described above.  Canal and foramina are  patent.    Sacrum and iliac wings:   The visualized sacrum and iliac wings are  within normal limits.    Impression  IMPRESSION:    Transitional L5 vertebra, with an enlarged left transverse process which  demonstrates pseudoarticulation with the sacrum.  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  No adjacent marrow edema noted  on the current examination.  Personal review of imaging recommended prior  to any possible intervention.    Mild degenerative changes at L4-5 with disc desiccation and facet  arthropathy with trace left facet joint effusion.  Canal and foramina  remain patent.    Otherwise unremarkable MRI appearance of the lumbar spine.  No  significant canal or foraminal narrowing.    Anatomic Thoracic/Lumbar Variant: Transitional L5 vertebral body.  L4-5  is considered the level of the iliac crest and there are 5 lumbar-type  vertebrae.                      : YANELY  Transcribe Date/Time: Sep 26 2022  8:48A    Dictated by : GAUDENCIO QUINONES MD    This examination was interpreted and the report reviewed and  electronically signed by:  GAUDENCIO QUINONES MD on Sep 26 2022  9:14AM  EST     No image results found.       No diagnosis found.     ASSESSMENT/PLAN  Monique Bender is a 28 y.o. female with a past medical history of migraine, arthralgia, headache, chronic low back pain, and CHALO who is presenting for follow-up evaluation of bilateral arm pain   Based on her clinical symptoms, signs, and imaging findings the patient's pain is most likely related to ulnar neuropathy secondary to compression at the cubital tunnel due to prolonged elbow flexion.  The patient states that her pain which travels in the ulnar like distribution happens almost exclusively after prolonged periods of elbow flexion, which wakes her up during the night.  During the day when her arms are not flexed she does not  syrinx the pain.  We have no imaging of the elbow at this time given the clinical signs and classical presentation seems like the most likely etiology etiology of her pain.  She also has no concerning symptoms that would indicate a radiculopathy as a more likely source of her pain.    At this time we will proceed with a steroid injection in the right elbow this will be used both help treat the more severe elbow as well as diagnostic to see if this is the source of her pain.  We also will place orders for bilateral elbow x-rays for the patient to see if there is any underlying arthritis or other contributing symptoms worsening his pain.       Our plan is as follows:  -In office right cubital tunnel steroid injection  -Order for bilateral elbow x-rays  - Continue to participate in physical therapy as well as physician directed home exercises  - Continue pain medications as prescribed       Jose Luis Ambrose MD

## 2024-09-05 ENCOUNTER — APPOINTMENT (OUTPATIENT)
Dept: PAIN MEDICINE | Facility: CLINIC | Age: 29
End: 2024-09-05
Payer: COMMERCIAL

## 2024-09-05 DIAGNOSIS — M25.521 PAIN OF BOTH ELBOWS: ICD-10-CM

## 2024-09-05 DIAGNOSIS — M25.522 PAIN OF BOTH ELBOWS: ICD-10-CM

## 2024-09-05 PROCEDURE — 99213 OFFICE O/P EST LOW 20 MIN: CPT | Performed by: PAIN MEDICINE

## 2024-09-05 NOTE — PROGRESS NOTES
9/5/2024    Virtual interview         Subjective   Patient ID: Monique Bender is a 28 y.o. female with a past medical history of migraine, arthralgia, headache, chronic low back pain, and CHALO who presents for bilateral elbow pain.     HPI:   Monique Bender is a 28 y.o. female with a past medical history of migraine, arthralgia, headache, chronic low back pain, and CHALO who presents for bilateral elbow pain. Patient had a in office right cubital tunnel steroid injection on August 15. Patient reports no relief at all with the injection. She reports that her pain is the same. She reports that she potentially had a reaction to the steroid used in the the injection, she reports that she was extremely tired and sleeping about 18 hours a day until this past weekend.  Patient reports that she continues to take gabapentin and ibuprofen as needed however her pain is about the same.  She reports that she has completed physical therapy in the past specifically for her elbows and arm and has had no relief and continues to do these exercises at home.  She had an x-ray done of her elbows that did not show any acute findings.  She has never seen a surgeon or a sports medicine physician in regards to her elbow pain.      Review of Systems   13-point ROS done and negative except for HPI.     Current Outpatient Medications   Medication Instructions    Ajovy Autoinjector 225 mg, Every 28 days    cetirizine (ZYRTEC) 10 mg, oral, Once    dexlansoprazole (DEXILANT) 60 mg, oral, Daily    ergocalciferol (Vitamin D-2) 1.25 MG (96440 UT) capsule 1 capsule, oral, Once Weekly    famotidine (PEPCID) 20 mg, oral, 2 times daily    gabapentin (NEURONTIN) 600 mg, oral, Nightly    hydrOXYzine HCL (Atarax) 50 mg tablet 1 tablet, oral, 3-4 TIMES DAILY.    lamoTRIgine (LAMICTAL) 100 mg, oral, Nightly    LORazepam (ATIVAN) 0.5 mg, oral, Daily PRN    norethindrone (AYGESTIN) 5 mg, oral, Daily RT    omeprazole (PriLOSEC) 40 mg DR capsule 1  capsule, oral, 2 times daily    ondansetron (Zofran) 4 mg tablet 1 tablet, oral, 2 times daily PRN    ondansetron ODT (Zofran-ODT) 4 mg disintegrating tablet 1 tablet, oral, Every 8 hours PRN    propranolol (INDERAL) 20 mg, oral, 3 times daily    sertraline (ZOLOFT) 100 mg, oral, Nightly, Take with 25 mg tab for total dose of 125 mg    Xolair 300 mg, subcutaneous       Past Medical History:   Diagnosis Date    Cough, unspecified 12/08/2014    Cough    Irregular heart beat     Local infection of the skin and subcutaneous tissue, unspecified 02/11/2014    Pustule    Personal history of other diseases of the digestive system 09/15/2017    History of gastritis    Personal history of other diseases of the respiratory system 12/06/2015    History of sinusitis    Personal history of other diseases of the respiratory system 09/21/2016    History of bronchitis    Personal history of other diseases of the respiratory system 12/06/2015    History of acute pharyngitis    Personal history of other diseases of the respiratory system 12/08/2014    History of upper respiratory infection    Personal history of other specified conditions 09/21/2015    History of urinary frequency    Rash and other nonspecific skin eruption 11/11/2014    Rash    Sinusitis     Sprain of unspecified ligament of unspecified ankle, initial encounter 02/19/2016    Ankle sprain    Status post hysteroscopy     d&c and iud placement    SVT (supraventricular tachycardia) (CMS-HCC)     Unspecified abdominal pain 12/16/2014    Side pain    Unspecified asthma, uncomplicated ([facility]) 12/20/2013    Childhood asthma    Unspecified injury of right foot, initial encounter 02/24/2016    Right foot injury    Urinary tract infection, site not specified 09/21/2016    Acute UTI    Urinary tract infection, site not specified 06/03/2016    UTI (lower urinary tract infection)        Past Surgical History:   Procedure Laterality Date    ADENOIDECTOMY      COLONOSCOPY       HYSTEROSCOPY      d and c    TONSILLECTOMY  09/11/2013    Tonsillectomy With Adenoidectomy    UPPER GASTROINTESTINAL ENDOSCOPY          Family History   Problem Relation Name Age of Onset    Cancer Paternal Grandfather          Allergies   Allergen Reactions    Cefdinir Anaphylaxis and Hives    Levofloxacin Myalgia and Other    Morphine Other    Prednisone Other    Prochlorperazine Anxiety, Other and Agitation    Amoxicillin-Pot Clavulanate Hives    Penicillins Hives    Sulfamethoxazole-Trimethoprim Hives    Ciprofloxacin Other    Sulfa (Sulfonamide Antibiotics) GI Upset    Aluminum Chloride Hexahydrate Rash    Esomeprazole Rash        Objective     There were no vitals filed for this visit.     Physical Exam  This was a virtual visit conducted via phone, thus an exam was deferred.    Assessment/Plan   Monique Bender is a 28 y.o. female with a past medical history of migraine, arthralgia, headache, chronic low back pain, and CHALO who presents for bilateral elbow pain. Patient had a in office right cubital tunnel steroid injection on August 15 with no relief at all.  The patient has completed physical therapy without any significant relief.  She also had x-rays of her elbows done that did not reveal any acute abnormalities.  Given that she has failed these treatment modalities and diagnostic modalities we will refer her to a sports medicine surgeon for further evaluation for ligamentous injury or structural defects.  We discussed with the patient that she should avoid any strenuous exercises or motions that cause her significant discomfort.    Plan:  -Will refer the patient to a orthopedic surgeon for further evaluation for any structural defects given that she failed treatment with a corticosteroid injection and has no acute abnormalities visualized on an x-ray and has also completed physical therapy  -Discussed with the patient that she should avoid any strenuous exercises or motions that cause her  significant discomfort      Follow up: After consult with orthopedic surgeon or sports medicine physician    The patient was invited to contact us back anytime with any questions or concerns and follow-up with us in the office as needed.     Diagnoses and all orders for this visit:  Pain of both elbows  -     Referral to Orthopaedic Surgery; Future      The patient was called on her cell phone and 2 modes of identification were used including her name and date of birth.     This note was generated with the aid of dictation software, there may be typos despite my attempts at proofreading.     Josse Jane MD  PGY-5  Interventional Pain Fellow

## 2024-09-26 ENCOUNTER — OFFICE VISIT (OUTPATIENT)
Dept: ORTHOPEDIC SURGERY | Facility: CLINIC | Age: 29
End: 2024-09-26
Payer: COMMERCIAL

## 2024-09-26 VITALS — BODY MASS INDEX: 51.91 KG/M2 | HEIGHT: 63 IN | WEIGHT: 293 LBS

## 2024-09-26 DIAGNOSIS — M25.522 PAIN OF BOTH ELBOWS: ICD-10-CM

## 2024-09-26 DIAGNOSIS — S76.911A MUSCLE STRAIN OF RIGHT THIGH, INITIAL ENCOUNTER: ICD-10-CM

## 2024-09-26 DIAGNOSIS — M77.8 LEFT ELBOW TENDONITIS: ICD-10-CM

## 2024-09-26 DIAGNOSIS — M77.8 RIGHT ELBOW TENDINITIS: Primary | ICD-10-CM

## 2024-09-26 DIAGNOSIS — M25.521 PAIN OF BOTH ELBOWS: ICD-10-CM

## 2024-09-26 PROCEDURE — 1036F TOBACCO NON-USER: CPT | Performed by: ORTHOPAEDIC SURGERY

## 2024-09-26 PROCEDURE — 99203 OFFICE O/P NEW LOW 30 MIN: CPT | Performed by: ORTHOPAEDIC SURGERY

## 2024-09-26 PROCEDURE — 99213 OFFICE O/P EST LOW 20 MIN: CPT | Performed by: ORTHOPAEDIC SURGERY

## 2024-09-26 PROCEDURE — 3008F BODY MASS INDEX DOCD: CPT | Performed by: ORTHOPAEDIC SURGERY

## 2024-09-26 ASSESSMENT — LIFESTYLE VARIABLES
HOW OFTEN DO YOU HAVE SIX OR MORE DRINKS ON ONE OCCASION: NEVER
HOW OFTEN DO YOU HAVE A DRINK CONTAINING ALCOHOL: NEVER
HOW OFTEN DURING THE LAST YEAR HAVE YOU BEEN UNABLE TO REMEMBER WHAT HAPPENED THE NIGHT BEFORE BECAUSE YOU HAD BEEN DRINKING: NEVER
HOW OFTEN DURING THE LAST YEAR HAVE YOU FOUND THAT YOU WERE NOT ABLE TO STOP DRINKING ONCE YOU HAD STARTED: NEVER
HOW OFTEN DURING THE LAST YEAR HAVE YOU NEEDED AN ALCOHOLIC DRINK FIRST THING IN THE MORNING TO GET YOURSELF GOING AFTER A NIGHT OF HEAVY DRINKING: NEVER
SKIP TO QUESTIONS 9-10: 1
AUDIT TOTAL SCORE: 0
HOW OFTEN DURING THE LAST YEAR HAVE YOU HAD A FEELING OF GUILT OR REMORSE AFTER DRINKING: NEVER
HAS A RELATIVE, FRIEND, DOCTOR, OR ANOTHER HEALTH PROFESSIONAL EXPRESSED CONCERN ABOUT YOUR DRINKING OR SUGGESTED YOU CUT DOWN: NO
HOW OFTEN DURING THE LAST YEAR HAVE YOU FAILED TO DO WHAT WAS NORMALLY EXPECTED FROM YOU BECAUSE OF DRINKING: NEVER
HOW MANY STANDARD DRINKS CONTAINING ALCOHOL DO YOU HAVE ON A TYPICAL DAY: PATIENT DOES NOT DRINK
HAVE YOU OR SOMEONE ELSE BEEN INJURED AS A RESULT OF YOUR DRINKING: NO
AUDIT-C TOTAL SCORE: 0

## 2024-09-26 ASSESSMENT — PAIN SCALES - GENERAL
PAINLEVEL: 0-NO PAIN
PAINLEVEL_OUTOF10: 0 - NO PAIN

## 2024-09-26 ASSESSMENT — PATIENT HEALTH QUESTIONNAIRE - PHQ9
SUM OF ALL RESPONSES TO PHQ9 QUESTIONS 1 AND 2: 0
1. LITTLE INTEREST OR PLEASURE IN DOING THINGS: NOT AT ALL
2. FEELING DOWN, DEPRESSED OR HOPELESS: NOT AT ALL

## 2024-09-26 ASSESSMENT — ENCOUNTER SYMPTOMS
OCCASIONAL FEELINGS OF UNSTEADINESS: 0
LOSS OF SENSATION IN FEET: 0
DEPRESSION: 0

## 2024-09-26 ASSESSMENT — PAIN - FUNCTIONAL ASSESSMENT: PAIN_FUNCTIONAL_ASSESSMENT: NO/DENIES PAIN

## 2024-09-26 ASSESSMENT — COLUMBIA-SUICIDE SEVERITY RATING SCALE - C-SSRS
6. HAVE YOU EVER DONE ANYTHING, STARTED TO DO ANYTHING, OR PREPARED TO DO ANYTHING TO END YOUR LIFE?: NO
1. IN THE PAST MONTH, HAVE YOU WISHED YOU WERE DEAD OR WISHED YOU COULD GO TO SLEEP AND NOT WAKE UP?: NO
2. HAVE YOU ACTUALLY HAD ANY THOUGHTS OF KILLING YOURSELF?: NO

## 2024-09-26 ASSESSMENT — PAIN DESCRIPTION - DESCRIPTORS: DESCRIPTORS: SHARP;SHOOTING

## 2024-09-26 NOTE — PROGRESS NOTES
Subjective      Chief Complaint   Patient presents with    Left Elbow - Pain    Right Elbow - Pain        No surgery found     HPI  This 28 year old female presents for evaluation of bilateral elbow pain (4/10). She states that this right and left elbow pain is worse with and aggravated by lying prone while sleeping.  She sleeps only lying prone.  She points to the right and left olecranon at the insertion of the triceps tendon as the area of pain.  She does not complain of pain at the medial or lateral epicondyles.    In addition she states that she fell on approximately 9-7-2024 and landed on her right thigh.  She noted right lateral thigh pain (7-8 over 10 at that time).  She states that this right lateral thigh pain has improved completely and she has resumed normal activities of daily living and walks normally.    Review of systems:    Cardiology:  Negative for chest pain, shortness of breath.   RESPIRATORY:   Negative for chest pain, shortness of breath.   MUSCULOSKELETAL:   See HPI for details.   NEUROLOGY:   Negative for tingling, numbness, weakness.    Objective    There were no vitals filed for this visit.    Physical Exam  GENERAL:          General Appearance:  This is a pleasant patient with appropriate affect, in no acute distress.   DERMATOLOGY:          Skin: skin at the neck, upper and lower back, and trunk is intact. There is no evidence of skin rash, skin breakdown or ulceration, or atrophic skin change.   EXTREMITIES:          Vascular:  Right, left hands and feet are warm with good color and pulses. Right and left calf and thigh are nontender and nonswollen.   NEUROLOGICAL:          Orientation:  Patient is alert and oriented to person, place, time and situation. Right and left upper and lower extremity motor and sensory examinations are intact.      MUSCULOSKELETAL: Neck: Nontender. No pain with range of motion.  Right and left elbows: The patient points to the right and left olecranon at the  insertion of the triceps tendon as the area of pain.  However there is no tenderness at the right or left olecranon area or at the right or left medial epicondyles.  Back: Nontender at lumbar spine.  Right hip and thigh.  The patient points to her right lateral thigh is an area that was painful but there is no tenderness at the right lateral thigh on examination today.  There is full range of motion of the right hip.  The patient walks independently with a normal gait.  X-rays of the right elbow and left elbow done at  on 8- are reviewed and are negative for fracture or destruction.  X-rays of the right femur and right ankle done at [facility] on 9-7-2024 are reported as negative for fracture.  I reviewed the x-rays and x-ray reports listed above with the patient in the office today.    No results found.     Monique was seen today for pain and pain.  Diagnoses and all orders for this visit:  Right elbow tendinitis (Primary)  Pain of both elbows  -     Referral to Orthopaedic Surgery  Left elbow tendonitis  Muscle strain of right thigh, initial encounter  Options are discussed with the patient in detail. The patient is instructed regarding activity modification and risk for further injury with falling or trauma, ice, physician directed at home gentle strengthening and ROM exercises, and the appropriate use of Tylenol as needed for pain with its potential adverse reactions and side effects. The patient understands.  Return as needed please note that this report has been produced using speech recognition software.  It may contain errors related to grammar, punctuation or spelling.  Electronically signed, but not reviewed.    Shad Alberto MD

## 2024-10-10 ENCOUNTER — APPOINTMENT (OUTPATIENT)
Dept: PAIN MEDICINE | Facility: CLINIC | Age: 29
End: 2024-10-10
Payer: COMMERCIAL

## 2024-10-10 DIAGNOSIS — M77.8 LEFT ELBOW TENDONITIS: Primary | ICD-10-CM

## 2024-10-10 PROCEDURE — 99214 OFFICE O/P EST MOD 30 MIN: CPT | Performed by: PAIN MEDICINE

## 2024-10-10 ASSESSMENT — PAIN SCALES - GENERAL: PAINLEVEL_OUTOF10: 6

## 2024-10-10 ASSESSMENT — PAIN - FUNCTIONAL ASSESSMENT: PAIN_FUNCTIONAL_ASSESSMENT: 0-10

## 2024-10-10 NOTE — PROGRESS NOTES
Subjective   Patient ID: Monique Bender is a 28 y.o. female with a past medical history of migraines, arthralgia, chronic low back pain, and CHALO presenting for follow-up regarding elbow pain.       HPI:   Ms. Monique Bender is a 28F with bilateral elbow pain likely secondary to tendonitis presenting for follow-up. She was previously seen on 9/5/24 for follow-up after a right cubital tunnel steroid injection on August 15 with no relief. Given that she failed PT and other conservative treatment and diagnostic modalities, referred patient to orthopedic surgery at that time. Patient recently saw orthopedic surgery and was instructed to ice and use Tylenol. She presents today with consistent pain. Right elbow is greater than left. No radicular symptoms or weakness. She reports that the pain is exacerbated by her sleeping position. It often wakes her up at night. Most days it improves as the day progresses. Today the right elbow pain has persisted, rated 3/10 in severity. She is able to localize the pain to above the olecranon at the insertion of the biceps tendon. Injection given in the office. Patient tolerated well, without any numbness, tingling, or weakness.    Physical Therapy: The patient has done six or more weeks of physical therapy in the past six months with minimal improvement  Other Conservative Measures she has tried: Heating Pad and Ice  Classes of medications tried in the past: Acetaminophen, NSAIDs, and Gabapentenoids    Review of Systems   13-point ROS done and negative except for HPI.     Current Outpatient Medications   Medication Instructions    Ajovy Autoinjector 225 mg, Every 28 days    cetirizine (ZYRTEC) 10 mg, oral, Once    dexlansoprazole (DEXILANT) 60 mg, oral, Daily    ergocalciferol (Vitamin D-2) 1.25 MG (25216 UT) capsule 1 capsule, oral, Once Weekly    famotidine (PEPCID) 20 mg, oral, 2 times daily    gabapentin (NEURONTIN) 600 mg, oral, Nightly    hydrOXYzine HCL (Atarax) 50 mg  tablet 1 tablet, oral, 3-4 TIMES DAILY.    lamoTRIgine (LAMICTAL) 100 mg, oral, Nightly    LORazepam (ATIVAN) 0.5 mg, oral, Daily PRN    norethindrone (AYGESTIN) 5 mg, oral, Daily RT    omeprazole (PriLOSEC) 40 mg DR capsule 1 capsule, oral, 2 times daily    ondansetron (Zofran) 4 mg tablet 1 tablet, oral, 2 times daily PRN    ondansetron ODT (Zofran-ODT) 4 mg disintegrating tablet 1 tablet, oral, Every 8 hours PRN    propranolol (INDERAL) 20 mg, oral, 3 times daily    sertraline (ZOLOFT) 100 mg, oral, Nightly, Take with 25 mg tab for total dose of 125 mg    Xolair 300 mg, subcutaneous       Past Medical History:   Diagnosis Date    Cough, unspecified 12/08/2014    Cough    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)     Irregular heart beat     Local infection of the skin and subcutaneous tissue, unspecified 02/11/2014    Pustule    Personal history of other diseases of the digestive system 09/15/2017    History of gastritis    Personal history of other diseases of the respiratory system 12/06/2015    History of sinusitis    Personal history of other diseases of the respiratory system 09/21/2016    History of bronchitis    Personal history of other diseases of the respiratory system 12/06/2015    History of acute pharyngitis    Personal history of other diseases of the respiratory system 12/08/2014    History of upper respiratory infection    Personal history of other specified conditions 09/21/2015    History of urinary frequency    POTS (postural orthostatic tachycardia syndrome)     Rash and other nonspecific skin eruption 11/11/2014    Rash    Sinusitis     Sprain of unspecified ligament of unspecified ankle, initial encounter 02/19/2016    Ankle sprain    Status post hysteroscopy     d&c and iud placement    SVT (supraventricular tachycardia) (CMS-HCC)     Unspecified abdominal pain 12/16/2014    Side pain    Unspecified asthma, uncomplicated ([facility]) 12/20/2013    Childhood asthma    Unspecified injury of right foot,  initial encounter 02/24/2016    Right foot injury    Urinary tract infection, site not specified 09/21/2016    Acute UTI    Urinary tract infection, site not specified 06/03/2016    UTI (lower urinary tract infection)        Past Surgical History:   Procedure Laterality Date    ADENOIDECTOMY      COLONOSCOPY      HYSTEROSCOPY      d and c    TONSILLECTOMY  09/11/2013    Tonsillectomy With Adenoidectomy    UPPER GASTROINTESTINAL ENDOSCOPY          Family History   Problem Relation Name Age of Onset    Hypertension Father      Bipolar disorder Father      Cancer Paternal Grandfather          Allergies   Allergen Reactions    Cefdinir Anaphylaxis and Hives    Levofloxacin Myalgia and Other    Morphine Other    Prednisone Other    Prochlorperazine Anxiety, Other and Agitation    Amoxicillin-Pot Clavulanate Hives    Penicillins Hives    Sulfamethoxazole-Trimethoprim Hives    Ciprofloxacin Other    Sulfa (Sulfonamide Antibiotics) GI Upset    Aluminum Chloride Hexahydrate Rash    Esomeprazole Rash        Objective     There were no vitals filed for this visit.     Physical Exam  General: NAD, well groomed, well nourished  Eyes: Non-icteric sclera, EOMI  Ears, Nose, Mouth, and Throat: External ears and nose appear to be without deformity or rash. No lesions or masses noted. Hearing is grossly intact.   Neck: Trachea midline  Respiratory: Nonlabored breathing   Cardiovascular: no peripheral edema   Skin: No rashes or open lesions/ulcers identified on skin.    Elbow:  Palpation: Tenderness to palpation over right olecranon, no pain on the left   Full range of motion bilaterally    Neurologic:   Cranial nerves grossly intact.   Strength: 5/5 and symmetric   Sensation: Normal to light touch throughout, pinprick intact throughout.  DTRs:normal and symmetric throughout  Nunez: absent  Clonus: absent    Psychiatric: Alert, orientation to person, place, and time. Cooperative.    Imaging personally reviewed and independently  interpreted: X-ray    Assessment/Plan   Ms. Monique Bender is a 28F with bilateral elbow pain presenting for follow-up. She is able to localize the pain to above the right olecranon at the insertion of the biceps tendon. No current pain on the left. Injection given in the office. Patient tolerated well, without any numbness, tingling, or weakness. Denies immediate improvement. Discussed need to continue/restart formal physical therapy. Continue Tylenol, ice. Follow-up as needed.    Plan:  - Right elbow trigger point injection in the office today  - Continue physical therapy and home exercises  - Follow-up as needed    The patient has failed treatment with : have significant limitations in their sleep quality due to the pain    We discussed  the risks, benefits and alternatives of the procedure including but not limited to: , Lack of efficacy , Transiently worsening pain , Bleeding, Infection , and Nerve Damage    Follow up: As needed     The patient was invited to contact us back anytime with any questions or concerns and follow-up with us in the office as needed.     There are no diagnoses linked to this encounter.    This note was generated with the aid of dictation software, there may be typos despite my attempts at proofreading.

## 2024-11-25 ENCOUNTER — APPOINTMENT (OUTPATIENT)
Dept: PAIN MEDICINE | Facility: CLINIC | Age: 29
End: 2024-11-25
Payer: COMMERCIAL

## 2024-11-25 ENCOUNTER — PREP FOR PROCEDURE (OUTPATIENT)
Dept: PAIN MEDICINE | Facility: CLINIC | Age: 29
End: 2024-11-25

## 2024-11-25 DIAGNOSIS — M47.816 LUMBAR SPONDYLOSIS: ICD-10-CM

## 2024-11-25 DIAGNOSIS — M47.816 FACET ARTHRITIS OF LUMBAR REGION: Primary | ICD-10-CM

## 2024-11-25 PROCEDURE — 99213 OFFICE O/P EST LOW 20 MIN: CPT | Performed by: PAIN MEDICINE

## 2024-11-25 NOTE — PROGRESS NOTES
11/25/2024    Virtual interview     Ms. Bender had virtual interview today. Last encounter was on 10/10/2024. She veery pleasant 29  female known to my pain clinic. She has been diagnosed with lumbar facet syndrome and Bertollotti  Syndrome. She had radiofrequency ablation on the left side back in July. She reported almost 100% relief of her left side back pain. Her side back pain was there not as bad. She reported today her rt side back pain is progressing to level she needs intervention . Pain is lower back referred posteriorly to buttock area no radiation no tingling no numbness . Worse by rotating the spine and bending to rt side. Worse standing from sitting position . The left side where she had the RF starts to come back and not yet as bad but it is progressing. She has been taking her home meds for pain as well doing her routine home exercises regularly   She denies weakness nor urine incontinence       Plan    Continue with home exercises   Schedule for left LRF  facet medial branch and rt diagnostic medial branch bloc  at level L3/4 4/5 5/S1       MD Sarah Bella MD   Resident  General Surgery     Progress Notes     Signed     Encounter Date: 10/10/2024     Signed       Expand All Collapse All       Subjective  Patient ID: Monique Bender is a 28 y.o. female with a past medical history of migraines, arthralgia, chronic low back pain, and CHALO presenting for follow-up regarding elbow pain.         HPI:   Ms. Monique Bender is a 28F with bilateral elbow pain likely secondary to tendonitis presenting for follow-up. She was previously seen on 9/5/24 for follow-up after a right cubital tunnel steroid injection on August 15 with no relief. Given that she failed PT and other conservative treatment and diagnostic modalities, referred patient to orthopedic surgery at that time. Patient recently saw orthopedic surgery and was instructed to ice and use Tylenol. She  presents today with consistent pain. Right elbow is greater than left. No radicular symptoms or weakness. She reports that the pain is exacerbated by her sleeping position. It often wakes her up at night. Most days it improves as the day progresses. Today the right elbow pain has persisted, rated 3/10 in severity. She is able to localize the pain to above the olecranon at the insertion of the biceps tendon. Injection given in the office. Patient tolerated well, without any numbness, tingling, or weakness.     Physical Therapy: The patient has done six or more weeks of physical therapy in the past six months with minimal improvement  Other Conservative Measures she has tried: Heating Pad and Ice  Classes of medications tried in the past: Acetaminophen, NSAIDs, and Gabapentenoids        Review of Systems  13-point ROS done and negative except for HPI.           Current Outpatient Medications   Medication Instructions    Ajovy Autoinjector 225 mg, Every 28 days    cetirizine (ZYRTEC) 10 mg, oral, Once    dexlansoprazole (DEXILANT) 60 mg, oral, Daily    ergocalciferol (Vitamin D-2) 1.25 MG (99381 UT) capsule 1 capsule, oral, Once Weekly    famotidine (PEPCID) 20 mg, oral, 2 times daily    gabapentin (NEURONTIN) 600 mg, oral, Nightly    hydrOXYzine HCL (Atarax) 50 mg tablet 1 tablet, oral, 3-4 TIMES DAILY.    lamoTRIgine (LAMICTAL) 100 mg, oral, Nightly    LORazepam (ATIVAN) 0.5 mg, oral, Daily PRN    norethindrone (AYGESTIN) 5 mg, oral, Daily RT    omeprazole (PriLOSEC) 40 mg DR capsule 1 capsule, oral, 2 times daily    ondansetron (Zofran) 4 mg tablet 1 tablet, oral, 2 times daily PRN    ondansetron ODT (Zofran-ODT) 4 mg disintegrating tablet 1 tablet, oral, Every 8 hours PRN    propranolol (INDERAL) 20 mg, oral, 3 times daily    sertraline (ZOLOFT) 100 mg, oral, Nightly, Take with 25 mg tab for total dose of 125 mg    Xolair 300 mg, subcutaneous         Medical History        Past Medical History:   Diagnosis Date     Cough, unspecified 12/08/2014     Cough    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[facility])      Irregular heart beat      Local infection of the skin and subcutaneous tissue, unspecified 02/11/2014     Pustule    Personal history of other diseases of the digestive system 09/15/2017     History of gastritis    Personal history of other diseases of the respiratory system 12/06/2015     History of sinusitis    Personal history of other diseases of the respiratory system 09/21/2016     History of bronchitis    Personal history of other diseases of the respiratory system 12/06/2015     History of acute pharyngitis    Personal history of other diseases of the respiratory system 12/08/2014     History of upper respiratory infection    Personal history of other specified conditions 09/21/2015     History of urinary frequency    POTS (postural orthostatic tachycardia syndrome)      Rash and other nonspecific skin eruption 11/11/2014     Rash    Sinusitis      Sprain of unspecified ligament of unspecified ankle, initial encounter 02/19/2016     Ankle sprain    Status post hysteroscopy       d&c and iud placement    SVT (supraventricular tachycardia) (CMS-HCC)      Unspecified abdominal pain 12/16/2014     Side pain    Unspecified asthma, uncomplicated ([facility]) 12/20/2013     Childhood asthma    Unspecified injury of right foot, initial encounter 02/24/2016     Right foot injury    Urinary tract infection, site not specified 09/21/2016     Acute UTI    Urinary tract infection, site not specified 06/03/2016     UTI (lower urinary tract infection)            Surgical History         Past Surgical History:   Procedure Laterality Date    ADENOIDECTOMY        COLONOSCOPY        HYSTEROSCOPY         d and c    TONSILLECTOMY   09/11/2013     Tonsillectomy With Adenoidectomy    UPPER GASTROINTESTINAL ENDOSCOPY                Family History          Family History   Problem Relation Name Age of Onset    Hypertension Father        Bipolar disorder  Father        Cancer Paternal Grandfather                RX Allergies        Allergies   Allergen Reactions    Cefdinir Anaphylaxis and Hives    Levofloxacin Myalgia and Other    Morphine Other    Prednisone Other    Prochlorperazine Anxiety, Other and Agitation    Amoxicillin-Pot Clavulanate Hives    Penicillins Hives    Sulfamethoxazole-Trimethoprim Hives    Ciprofloxacin Other    Sulfa (Sulfonamide Antibiotics) GI Upset    Aluminum Chloride Hexahydrate Rash    Esomeprazole Rash               Objective  There were no vitals filed for this visit.      Physical Exam  General: NAD, well groomed, well nourished  Eyes: Non-icteric sclera, EOMI  Ears, Nose, Mouth, and Throat: External ears and nose appear to be without deformity or rash. No lesions or masses noted. Hearing is grossly intact.   Neck: Trachea midline  Respiratory: Nonlabored breathing   Cardiovascular: no peripheral edema   Skin: No rashes or open lesions/ulcers identified on skin.     Elbow:  Palpation: Tenderness to palpation over right olecranon, no pain on the left   Full range of motion bilaterally     Neurologic:   Cranial nerves grossly intact.   Strength: 5/5 and symmetric   Sensation: Normal to light touch throughout, pinprick intact throughout.  DTRs:normal and symmetric throughout  Nunez: absent  Clonus: absent     Psychiatric: Alert, orientation to person, place, and time. Cooperative.     Imaging personally reviewed and independently interpreted: X-ray        Assessment/Plan  Ms. Monique Bender is a 28F with bilateral elbow pain presenting for follow-up. She is able to localize the pain to above the right olecranon at the insertion of the biceps tendon. No current pain on the left. Injection given in the office. Patient tolerated well, without any numbness, tingling, or weakness. Denies immediate improvement. Discussed need to continue/restart formal physical therapy. Continue Tylenol, ice. Follow-up as needed.     Plan:  - Right elbow  trigger point injection in the office today  - Continue physical therapy and home exercises  - Follow-up as needed     The patient has failed treatment with : have significant limitations in their sleep quality due to the pain     We discussed  the risks, benefits and alternatives of the procedure including but not limited to: , Lack of efficacy , Transiently worsening pain , Bleeding, Infection , and Nerve Damage     Follow up: As needed      The patient was invited to contact us back anytime with any questions or concerns and follow-up with us in the office as needed.      There are no diagnoses linked to this encounter.     This note was generated with the aid of dictation software, there may be typos despite my attempts at proofreading.                Cosigned by: Ramesh Ortiz MD at 10/10/2024  3:09 PM   Electronically signed by Sarah Seymour MD at 10/10/2024  3:09 PM  Electronically signed by Ramesh Ortiz MD at 10/10/2024  3:09 PM         Office Visit on 10/10/2024            Revision History          Note shared with patient  Additional Documentation    Flowsheets: Pain Assessment,     Pain Assessment,     Interfaced Flowsheet Data   Encounter Info: Billing Info,     History,     Allergies     Orders Placed    None  Medication Changes      None  Medication List  Visit Diagnoses      Left elbow tendonitis  Problem List

## 2025-02-10 ENCOUNTER — TELEPHONE (OUTPATIENT)
Dept: PAIN MEDICINE | Facility: CLINIC | Age: 30
End: 2025-02-10
Payer: COMMERCIAL

## 2025-03-26 ENCOUNTER — APPOINTMENT (OUTPATIENT)
Dept: PAIN MEDICINE | Facility: CLINIC | Age: 30
End: 2025-03-26
Payer: COMMERCIAL